# Patient Record
Sex: MALE | Race: WHITE
[De-identification: names, ages, dates, MRNs, and addresses within clinical notes are randomized per-mention and may not be internally consistent; named-entity substitution may affect disease eponyms.]

---

## 2017-03-11 ENCOUNTER — HOSPITAL ENCOUNTER (INPATIENT)
Dept: HOSPITAL 17 - NEPB | Age: 32
LOS: 2 days | Discharge: LEFT BEFORE DISCHARGE | DRG: 854 | End: 2017-03-13
Attending: HOSPITALIST | Admitting: HOSPITALIST
Payer: COMMERCIAL

## 2017-03-11 VITALS
DIASTOLIC BLOOD PRESSURE: 90 MMHG | OXYGEN SATURATION: 100 % | RESPIRATION RATE: 24 BRPM | TEMPERATURE: 100.8 F | SYSTOLIC BLOOD PRESSURE: 132 MMHG | HEART RATE: 104 BPM

## 2017-03-11 DIAGNOSIS — F17.200: ICD-10-CM

## 2017-03-11 DIAGNOSIS — F14.10: ICD-10-CM

## 2017-03-11 DIAGNOSIS — A41.9: Primary | ICD-10-CM

## 2017-03-11 DIAGNOSIS — G89.29: ICD-10-CM

## 2017-03-11 DIAGNOSIS — L02.612: ICD-10-CM

## 2017-03-11 DIAGNOSIS — F11.10: ICD-10-CM

## 2017-03-11 DIAGNOSIS — L02.611: ICD-10-CM

## 2017-03-11 DIAGNOSIS — E11.9: ICD-10-CM

## 2017-03-11 DIAGNOSIS — L03.116: ICD-10-CM

## 2017-03-11 LAB
ALP SERPL-CCNC: 106 U/L (ref 45–117)
ALT SERPL-CCNC: 21 U/L (ref 12–78)
ANION GAP SERPL CALC-SCNC: 10 MEQ/L (ref 5–15)
AST SERPL-CCNC: 32 U/L (ref 15–37)
BASOPHILS # BLD AUTO: 0.1 TH/MM3 (ref 0–0.2)
BASOPHILS NFR BLD: 0.6 % (ref 0–2)
BILIRUB SERPL-MCNC: 0.7 MG/DL (ref 0.2–1)
BUN SERPL-MCNC: 10 MG/DL (ref 7–18)
CHLORIDE SERPL-SCNC: 97 MEQ/L (ref 98–107)
EOSINOPHIL # BLD: 0 TH/MM3 (ref 0–0.4)
EOSINOPHIL NFR BLD: 0.3 % (ref 0–4)
ERYTHROCYTE [DISTWIDTH] IN BLOOD BY AUTOMATED COUNT: 14 % (ref 11.6–17.2)
GFR SERPLBLD BASED ON 1.73 SQ M-ARVRAT: 81 ML/MIN (ref 89–?)
HCO3 BLD-SCNC: 28.4 MEQ/L (ref 21–32)
HCT VFR BLD CALC: 40.5 % (ref 39–51)
HEMO FLAGS: (no result)
LYMPHOCYTES # BLD AUTO: 1.9 TH/MM3 (ref 1–4.8)
LYMPHOCYTES NFR BLD AUTO: 10.7 % (ref 9–44)
MCH RBC QN AUTO: 30.1 PG (ref 27–34)
MCHC RBC AUTO-ENTMCNC: 34.5 % (ref 32–36)
MCV RBC AUTO: 87.3 FL (ref 80–100)
MONOCYTES NFR BLD: 5.8 % (ref 0–8)
NEUTROPHILS # BLD AUTO: 14.9 TH/MM3 (ref 1.8–7.7)
NEUTROPHILS NFR BLD AUTO: 82.6 % (ref 16–70)
PLATELET # BLD: 278 TH/MM3 (ref 150–450)
POTASSIUM SERPL-SCNC: 4 MEQ/L (ref 3.5–5.1)
RBC # BLD AUTO: 4.63 MIL/MM3 (ref 4.5–5.9)
SODIUM SERPL-SCNC: 135 MEQ/L (ref 136–145)
WBC # BLD AUTO: 18 TH/MM3 (ref 4–11)

## 2017-03-11 PROCEDURE — 83605 ASSAY OF LACTIC ACID: CPT

## 2017-03-11 PROCEDURE — 87070 CULTURE OTHR SPECIMN AEROBIC: CPT

## 2017-03-11 PROCEDURE — 87040 BLOOD CULTURE FOR BACTERIA: CPT

## 2017-03-11 PROCEDURE — 96375 TX/PRO/DX INJ NEW DRUG ADDON: CPT

## 2017-03-11 PROCEDURE — 87186 SC STD MICRODIL/AGAR DIL: CPT

## 2017-03-11 PROCEDURE — 86403 PARTICLE AGGLUT ANTBDY SCRN: CPT

## 2017-03-11 PROCEDURE — 85025 COMPLETE CBC W/AUTO DIFF WBC: CPT

## 2017-03-11 PROCEDURE — 86140 C-REACTIVE PROTEIN: CPT

## 2017-03-11 PROCEDURE — 73630 X-RAY EXAM OF FOOT: CPT

## 2017-03-11 PROCEDURE — 73720 MRI LWR EXTREMITY W/O&W/DYE: CPT

## 2017-03-11 PROCEDURE — A9579 GAD-BASE MR CONTRAST NOS,1ML: HCPCS

## 2017-03-11 PROCEDURE — 87147 CULTURE TYPE IMMUNOLOGIC: CPT

## 2017-03-11 PROCEDURE — 96374 THER/PROPH/DIAG INJ IV PUSH: CPT

## 2017-03-11 PROCEDURE — 87206 SMEAR FLUORESCENT/ACID STAI: CPT

## 2017-03-11 PROCEDURE — 80053 COMPREHEN METABOLIC PANEL: CPT

## 2017-03-11 PROCEDURE — 87205 SMEAR GRAM STAIN: CPT

## 2017-03-11 PROCEDURE — 87102 FUNGUS ISOLATION CULTURE: CPT

## 2017-03-11 PROCEDURE — 87116 MYCOBACTERIA CULTURE: CPT

## 2017-03-11 PROCEDURE — 85652 RBC SED RATE AUTOMATED: CPT

## 2017-03-11 PROCEDURE — 80202 ASSAY OF VANCOMYCIN: CPT

## 2017-03-11 PROCEDURE — 82565 ASSAY OF CREATININE: CPT

## 2017-03-11 PROCEDURE — 87015 SPECIMEN INFECT AGNT CONCNTJ: CPT

## 2017-03-11 NOTE — RADRPT
EXAM DATE/TIME:  03/11/2017 22:30 

 

[facility] COMPARISON:     

No previous studies available for comparison.

 

                     

INDICATIONS :     

Left foot pain and swelling. Abscess on top of foot.

                     

 

MEDICAL HISTORY :     

None.          

 

SURGICAL HISTORY :     

None.   

 

ENCOUNTER:     

Initial                                        

 

ACUITY:     

1 day      

 

PAIN SCORE:     

Non-responsive.

 

LOCATION:     

Left  foot.

 

FINDINGS:     

There is soft tissue swelling of the lateral foot. No associated bony abnormalities identified. No fr
acture or dislocation. No bony destructive changes.

 

CONCLUSION:     

1. Soft tissue swelling on dorsal lateral aspect of the foot. No acute bony abnormality.

 

 

 

 Brian Ngo MD on March 11, 2017 at 22:41           

Board Certified Radiologist.

 This report was verified electronically.

## 2017-03-11 NOTE — PD
HPI


Chief Complaint:  abscess


Time Seen by Provider:  21:42


Travel History


International Travel<30 days:  No


Contact w/Intl Traveler<30days:  No


Traveled to known affect area:  No





History of Present Illness


HPI


Patient comes in complaining of left foot pain/abscess began approximately 6 

days ago.  Patient states started off as a small bump he pressed on and felt it 

spread underneath his foot and shoot up his leg.  He states his has become 

progressively worse for the past 3 days.  Patient reports having burning/

throbbing/aching pain in his foot that radiates proximally.  Patient states 

that his toes of his left foot are numb.  Denies any known fevers, nausea, 

vomiting, IV drug use, abdominal pain, chest pain, shortness of breath, or 

anything making the pain better.  Patient was reports that he takes Dilaudid by 

mouth 3 times a day for chronic pain status post a 5 hours ago.  Patient also 

reports taking Robaxin and Xanax.  Patient states that approximately 8 months 

ago he was told that he was diabetic but has not really followed up on it and 

only checks his blood sugars occasionally.  Patient reports he was prescribed 

insulin but does not take it.





PFSH


Past Medical History


Hx Anticoagulant Therapy:  No


Arthritis:  No


Cancer:  No


Cardiovascular Problems:  No


Chemotherapy:  No


Chest Pain:  No


Cerebrovascular Accident:  No


Diabetes:  No


Diminished Hearing:  No


Endocrine:  No


Gastrointestinal Disorders:  No


Genitourinary:  No


Immune Disorder:  No


Musculoskeletal:  Yes


Neurologic:  No


Psychiatric:  No


Reproductive:  No


Respiratory:  No


Immunizations Current:  No


Migraines:  No


Radiation Therapy:  No


Seizures:  No





Past Surgical History


Abdominal Surgery:  No


Cardiac Surgery:  No


Ear Surgery:  No


Endocrine Surgery:  No


Eye Surgery:  No


Genitourinary Surgery:  No


Gynecologic Surgery:  No


Hysterectomy:  No


Neurologic Surgery:  No


Oral Surgery:  No


Thoracic Surgery:  No


Other Surgery:  Yes (right arm )





Social History


Alcohol Use:  No


Tobacco Use:  Yes (1/2 ppd)


Substance Use:  Yes (Positive for Cocaine)





Allergies-Medications


(Allergen,Severity, Reaction):  


Coded Allergies:  


     No Known Allergies (Unverified , 3/11/17)


Reported Meds & Prescriptions





Reported Meds & Active Scripts


Active


Reported


Dilaudid 8 mg (Hydromorphone HCl) 8 Mg Tab 8 Mg PO TID


Robaxin (Methocarbamol) 750 Mg Tab 750 Mg PO BID








Review of Systems


Except as stated in HPI:  all other systems reviewed are Neg





Physical Exam


Narrative


GENERAL: Well-developed, well nourished, and non-ill appearing.  Patient 

rolling around in bed grabbing his foot and moaning.


SKIN: Erythematous, febrile, fluctuant abscess in the left foot with 

surrounding cellulitis and streaking up the leg.  There is no crepitus.  Scant 

yellowish drainage is noted.


HEAD: Atraumatic. Normocephalic. 


EYES: Pupils equal and round. EOMI. No scleral icterus. No injection or 

drainage. 


ENT: No nasal bleeding or discharge.  Mucous membranes pink and moist.


NECK: Trachea midline. Supple. No nuclear rigidity.


CARDIOVASCULAR: Regular rate and rhythm.  No murmur appreciated.


RESPIRATORY: No accessory muscle use.  No respiratory distress. Clear to 

auscultation. Breath sounds equal bilaterally. 


MUSCULOSKELETAL: No obvious deformities. No clubbing.  No cyanosis.  Soft 

tissue swelling left foot.  Full range of motion.


NEUROLOGICAL: Awake and alert. No obvious cranial nerve deficits.  Motor 

grossly within normal limits. Normal speech.


PSYCHIATRIC: Appropriate mood and affect; insight and judgment normal.





Data


Data


Last Documented VS





Vital Signs








  Date Time  Temp Pulse Resp B/P Pulse Ox O2 Delivery O2 Flow Rate FiO2


 


3/11/17 22:15 100.8 104 24 132/90 100   








Orders





 Wound Culture And Gram Stain (3/11/17 21:35)


Lidocai-Epi 1%-1:100,000 Inj (Xylocaine- (3/11/17 21:45)


Complete Blood Count With Diff (3/11/17 21:35)


Comprehensive Metabolic Panel (3/11/17 21:35)


Lactic Acid Sepsis Protocol (3/11/17 21:35)


Blood Culture (3/11/17 21:35)


Ecg Monitoring (3/11/17 21:35)


Iv Access Insert/Monitor (3/11/17 21:35)


Oximetry (3/11/17 21:35)


Oxygen Administration (3/11/17 21:35)


Hydromorphone Pf Inj (Dilaudid Pf Inj) (3/11/17 21:45)


Ondansetron Inj (Zofran Inj) (3/11/17 21:45)


Piperacil-Tazo 4.5 Gm Premix (Zosyn 4.5 (3/11/17 21:35)


Vancomycin Inj (Vancomycin Inj) (3/11/17 21:35)


Sodium Chlor 0.9% 1000 Ml Inj (Ns 1000 M (3/11/17 21:45)


C-Reactive Protein (Crp) (3/11/17 21:35)


Foot, Complete ([id number]) (3/11/17 )


Westergren Sedimentation Rate (3/11/17 21:55)


Ibuprofen (Motrin) (3/11/17 22:15)


Sodium Chlor 0.9% 1000 Ml Inj (Ns 1000 M (3/11/17 23:30)


Admit Order (Ed Use Only) (3/11/17 23:19)





Labs





 Laboratory Tests








Test 3/11/17 3/11/17





 22:24 22:30


 


White Blood Count 18.0 TH/MM3 


 


Red Blood Count 4.63 MIL/MM3 


 


Hemoglobin 13.9 GM/DL 


 


Hematocrit 40.5 % 


 


Mean Corpuscular Volume 87.3 FL 


 


Mean Corpuscular Hemoglobin 30.1 PG 


 


Mean Corpuscular Hemoglobin 34.5 % 





Concent  


 


Red Cell Distribution Width 14.0 % 


 


Platelet Count 278 TH/MM3 


 


Mean Platelet Volume 8.4 FL 


 


Neutrophils (%) (Auto) 82.6 % 


 


Lymphocytes (%) (Auto) 10.7 % 


 


Monocytes (%) (Auto) 5.8 % 


 


Eosinophils (%) (Auto) 0.3 % 


 


Basophils (%) (Auto) 0.6 % 


 


Neutrophils # (Auto) 14.9 TH/MM3 


 


Lymphocytes # (Auto) 1.9 TH/MM3 


 


Monocytes # (Auto) 1.0 TH/MM3 


 


Eosinophils # (Auto) 0.0 TH/MM3 


 


Basophils # (Auto) 0.1 TH/MM3 


 


CBC Comment DIFF FINAL  


 


Differential Comment   


 


Sodium Level 135 MEQ/L 


 


Potassium Level 4.0 MEQ/L 


 


Chloride Level 97 MEQ/L 


 


Carbon Dioxide Level 28.4 MEQ/L 


 


Anion Gap 10 MEQ/L 


 


Blood Urea Nitrogen 10 MG/DL 


 


Creatinine 1.07 MG/DL 


 


Estimat Glomerular Filtration 81 ML/MIN 





Rate  


 


Random Glucose 108 MG/DL 


 


Calcium Level 8.7 MG/DL 


 


Total Bilirubin 0.7 MG/DL 


 


Aspartate Amino Transf 32 U/L 





(AST/SGOT)  


 


Alanine Aminotransferase 21 U/L 





(ALT/SGPT)  


 


Alkaline Phosphatase 106 U/L 


 


C-Reactive Protein 14.70 MG/DL 


 


Total Protein 8.2 GM/DL 


 


Albumin 3.7 GM/DL 


 


Erythrocyte Sedimentation Rate  15 mm/hr











MDM


Medical Decision Making


Medical Screen Exam Complete:  Yes


Emergency Medical Condition:  Yes


Differential Diagnosis


Abscess, cellulitis, osteomyelitis, sepsis, other


Narrative Course


Patient was seen and examined.  Initial laboratory and radiological studies 

were ordered.  Patient is given IV fluids, IV antibiotics Zosyn and vancomycin, 

and a wound culture was obtained from liquid that is draining from his left 

foot.  Patient was given Dilaudid for pain and ibuprofen for his fever.





Discussed patient with Dr. Rees, who came and evaluated the patient and 

recommends having the patient admitted for IV antibiotics and possible I&D by 

podiatry.





2320 patient's family is at bedside reports patient is not acting his normal 

self.  Discussed all findings and plan of care with patient and family, who is 

agreeable for admission.  All questions were answered.





Sepsis Criteria


SIRS Criteria (2 or more):  Heart rate over 90, RR  > 20 or PaCO2 < 32, WBC > 

84457, < 4000 or > 10% bands


Sepsis Criteria (SIRS+source):  Infect source susp/known


Criteria Outcome:  Meets sepsis criteria





Physician Communication


Physician Communication


2314 discussed patient with Dr. Knapp, was agreeable to admit the patient.





Diagnosis





 Primary Impression:  


 Sepsis


 Qualified Code:  A41.9 - Sepsis, due to unspecified organism


 Additional Impressions:  


 Cellulitis of left foot


 Foot abscess, left





Admitting Information


Admitting Physician Requests:  Admit


Condition:  Stable








Carlos Foss Mar 11, 2017 21:50

## 2017-03-12 VITALS
RESPIRATION RATE: 14 BRPM | OXYGEN SATURATION: 96 % | TEMPERATURE: 98.1 F | HEART RATE: 64 BPM | DIASTOLIC BLOOD PRESSURE: 68 MMHG | SYSTOLIC BLOOD PRESSURE: 108 MMHG

## 2017-03-12 VITALS
HEART RATE: 66 BPM | DIASTOLIC BLOOD PRESSURE: 60 MMHG | RESPIRATION RATE: 19 BRPM | SYSTOLIC BLOOD PRESSURE: 126 MMHG | OXYGEN SATURATION: 97 % | TEMPERATURE: 98.6 F

## 2017-03-12 VITALS
DIASTOLIC BLOOD PRESSURE: 62 MMHG | TEMPERATURE: 98.9 F | SYSTOLIC BLOOD PRESSURE: 103 MMHG | HEART RATE: 58 BPM | RESPIRATION RATE: 18 BRPM | OXYGEN SATURATION: 98 %

## 2017-03-12 VITALS
HEART RATE: 67 BPM | OXYGEN SATURATION: 96 % | SYSTOLIC BLOOD PRESSURE: 105 MMHG | TEMPERATURE: 98.5 F | RESPIRATION RATE: 16 BRPM | DIASTOLIC BLOOD PRESSURE: 63 MMHG

## 2017-03-12 VITALS
TEMPERATURE: 97.2 F | HEART RATE: 50 BPM | SYSTOLIC BLOOD PRESSURE: 100 MMHG | RESPIRATION RATE: 18 BRPM | DIASTOLIC BLOOD PRESSURE: 59 MMHG | OXYGEN SATURATION: 99 %

## 2017-03-12 VITALS — HEART RATE: 83 BPM

## 2017-03-12 VITALS — HEART RATE: 56 BPM

## 2017-03-12 LAB
ALP SERPL-CCNC: 94 U/L (ref 45–117)
ALT SERPL-CCNC: 19 U/L (ref 12–78)
ANION GAP SERPL CALC-SCNC: 8 MEQ/L (ref 5–15)
AST SERPL-CCNC: 17 U/L (ref 15–37)
BASOPHILS # BLD AUTO: 0.1 TH/MM3 (ref 0–0.2)
BASOPHILS NFR BLD: 0.6 % (ref 0–2)
BILIRUB SERPL-MCNC: 0.6 MG/DL (ref 0.2–1)
BUN SERPL-MCNC: 8 MG/DL (ref 7–18)
CHLORIDE SERPL-SCNC: 105 MEQ/L (ref 98–107)
EOSINOPHIL # BLD: 0 TH/MM3 (ref 0–0.4)
EOSINOPHIL NFR BLD: 0.2 % (ref 0–4)
ERYTHROCYTE [DISTWIDTH] IN BLOOD BY AUTOMATED COUNT: 13.6 % (ref 11.6–17.2)
GFR SERPLBLD BASED ON 1.73 SQ M-ARVRAT: 98 ML/MIN (ref 89–?)
HCO3 BLD-SCNC: 25.9 MEQ/L (ref 21–32)
HCT VFR BLD CALC: 37.7 % (ref 39–51)
HEMO FLAGS: (no result)
LYMPHOCYTES # BLD AUTO: 3.2 TH/MM3 (ref 1–4.8)
LYMPHOCYTES NFR BLD AUTO: 18.2 % (ref 9–44)
MCH RBC QN AUTO: 29.8 PG (ref 27–34)
MCHC RBC AUTO-ENTMCNC: 33.3 % (ref 32–36)
MCV RBC AUTO: 89.4 FL (ref 80–100)
MONOCYTES NFR BLD: 7.6 % (ref 0–8)
NEUTROPHILS # BLD AUTO: 12.9 TH/MM3 (ref 1.8–7.7)
NEUTROPHILS NFR BLD AUTO: 73.4 % (ref 16–70)
PLATELET # BLD: 235 TH/MM3 (ref 150–450)
POTASSIUM SERPL-SCNC: 3.7 MEQ/L (ref 3.5–5.1)
RBC # BLD AUTO: 4.21 MIL/MM3 (ref 4.5–5.9)
SODIUM SERPL-SCNC: 139 MEQ/L (ref 136–145)
WBC # BLD AUTO: 17.6 TH/MM3 (ref 4–11)

## 2017-03-12 RX ADMIN — MORPHINE SULFATE PRN MG: 2 INJECTION, SOLUTION INTRAMUSCULAR; INTRAVENOUS at 17:50

## 2017-03-12 RX ADMIN — MORPHINE SULFATE PRN MG: 2 INJECTION, SOLUTION INTRAMUSCULAR; INTRAVENOUS at 21:30

## 2017-03-12 RX ADMIN — SODIUM CHLORIDE, PRESERVATIVE FREE SCH ML: 5 INJECTION INTRAVENOUS at 21:00

## 2017-03-12 RX ADMIN — PHENYTOIN SODIUM SCH MLS/HR: 50 INJECTION INTRAMUSCULAR; INTRAVENOUS at 03:51

## 2017-03-12 RX ADMIN — VANCOMYCIN HYDROCHLORIDE SCH MLS/HR: 1 INJECTION, SOLUTION INTRAVENOUS at 08:32

## 2017-03-12 RX ADMIN — MORPHINE SULFATE PRN MG: 2 INJECTION, SOLUTION INTRAMUSCULAR; INTRAVENOUS at 03:53

## 2017-03-12 RX ADMIN — CEFEPIME SCH MLS/HR: 1 INJECTION, POWDER, FOR SOLUTION INTRAMUSCULAR; INTRAVENOUS at 08:38

## 2017-03-12 RX ADMIN — PHENYTOIN SODIUM SCH MLS/HR: 50 INJECTION INTRAMUSCULAR; INTRAVENOUS at 21:25

## 2017-03-12 RX ADMIN — SODIUM CHLORIDE, PRESERVATIVE FREE SCH ML: 5 INJECTION INTRAVENOUS at 08:38

## 2017-03-12 RX ADMIN — CEFEPIME SCH MLS/HR: 1 INJECTION, POWDER, FOR SOLUTION INTRAMUSCULAR; INTRAVENOUS at 21:26

## 2017-03-12 RX ADMIN — MORPHINE SULFATE PRN MG: 2 INJECTION, SOLUTION INTRAMUSCULAR; INTRAVENOUS at 08:31

## 2017-03-12 RX ADMIN — VANCOMYCIN HYDROCHLORIDE SCH MLS/HR: 1 INJECTION, SOLUTION INTRAVENOUS at 16:40

## 2017-03-12 NOTE — HHI.PR
Subjective


Remarks


good po appetite


poked his foot with a razor  a week ago





is on chronic pain meds- Dilaudid 8 mg po q 8- sees Dr. Mnedoza - pain 

  MD from [place]





Objective


Vitals





 Vital Signs








  Date Time  Temp Pulse Resp B/P Pulse Ox O2 Delivery O2 Flow Rate FiO2


 


3/12/17 12:00 98.1 64 14 108/68 96   


 


3/12/17 08:01 98.9 58 18 103/62 98   


 


3/12/17 07:19  56      


 


3/12/17 04:00 97.2 53 18 108/59 100   


 


3/12/17 01:20 98.5 67 16 105/63 96 Room Air  


 


3/12/17 00:19     100 Room Air  


 


3/11/17 22:15 100.8 104 24 132/90 100   








 I/O








 3/11/17 3/11/17 3/11/17 3/12/17 3/12/17 3/12/17





 07:00 15:00 23:00 07:00 15:00 23:00


 


Intake Total    300 ml  


 


Balance    300 ml  


 


      


 


Intake IV Total    300 ml  








Result Diagram:  


3/12/17 0407                                                                   

             3/12/17 0457





Imaging





Last Impressions








Foot X-Ray 3/11/17 0000 Signed





Impressions: 





 Service Date/Time:  Saturday, March 11, 2017 22:30 - CONCLUSION:  1. Soft 

tissue 





 swelling on dorsal lateral aspect of the foot. No acute bony abnormality.     





 Brian Ngo MD 








Objective Remarks


awake and alert, NAD


anicteric


lungs clear


regular rhythm


abdomen soft


Left entire foot- marked swelling and erythema with induration  on anterior 

aspect, tender 


   ++ DP, PT. moves toes freely





A/P


Problem List:  


(1) Sepsis


ICD Code:  A41.9


Status:  Acute


(2) Foot abscess, left


ICD Code:  L02.612


Status:  Acute


(3) Cellulitis of left foot


ICD Code:  L03.116


Status:  Acute


(4) Tobacco abuse


ICD Code:  Z72.0


Status:  Acute


(5) Substance abuse


ICD Code:  F19.10


Status:  Acute


Assessment and Plan


A/P:





Sepsis:  secondary to -Left Foot Abscess/Cellulitis, On Cefepime and Vancomycin


   Follow up cultures, continue IV Abx. Podiatry consulted - need for I and D


Substance Abuse:  h/o Cocaine/Opiate abuse


Chronic pain, reports takes Dilaudid po for chronic pain.  


   confirmed - father brought in Dilaudid bottles filled 3/7- MD Dr. Mendoza- 

Dilaudid  8 mg po q 8 prn for pain- we will continue this


   continue on prn IV Morphine . DC po Lortab


   counselled extensively


Tobacco Abuse:  Counselled.  Ativan/NicoDerm prn for withdrawal.


DVT Prophylaxis:  Mechanical contraindication due to wound


Social work for d/c planning as needed. 





discussed with patient and father





Problem Qualifiers





(1) Sepsis:  


Qualified Code:  A41.9 - Sepsis, due to unspecified organism





Komal Rizo MD Mar 12, 2017 13:57

## 2017-03-12 NOTE — RADRPT
EXAM DATE/TIME:  03/12/2017 09:31 

 

[facility] COMPARISON:     

No previous studies available for comparison.

       

 

 

INDICATIONS :     

***Abscess. 

                     

 

CONTRAST:     

10 cc Omniscan (gadodiamide) IV

                     

 

MEDICAL HISTORY :     

Hypertension. Diabetes mellitus type 2.   IVDA.

 

SURGICAL HISTORY :           

Prior multiple abcess drainage.

 

ENCOUNTER:     

Subsequent

 

ACUITY:     

4-6 days

 

PAIN SCORE:     

6/10

 

LOCATION:     

Left   foot

 

TECHNIQUE:     

Multiplanar, multisequence MRI examination was performed without contrast and after the intravenous a
dministration of gadolinium.

 

FINDINGS:     

 

BONE/CARTILAGE:     

There is some mild increased signal on the T2-weighted images at the distal calcaneus at the anterior
 subtalar region and in the navicular bone and the lateral cuneiform bone. These could be areas of celestino
ne bruising or may represent some inflammatory change/granulation around the joints. The signal abnor
mality is mild.

 

TENDONS:     

All of the visualized tendons are intact.

 

MISCELLANEOUS:     

Plantar aponeurosis is intact.  Sinus tarsi is within normal limits.

 

POST-CONTRAST:     

There is a loculated fluid collection in the superficial lateral midfoot extending over the cuneiform
 bones measuring approximately 7.5 cm in AP dimension, 4.5 cm in transverse dimension and 1.6 cm in t
hickness. This demonstrates rim enhancement. This mass is superficial to the extensor tendons.

 

CONCLUSION:     

1. 7 cm superficial loculated fluid collection at the dorsal lateral mid to hindfoot likely represent
ing an abscess.

2. Mild areas of patchy increased signal within the bones of the hindfoot and midfoot as described ab
ove.

 

 

 

 Milton Diop MD on March 12, 2017 at 10:24           

Board Certified Radiologist.

 This report was verified electronically.

## 2017-03-12 NOTE — PD.CONS
History of Present Illness


Service


podiatry


Consult Requested By





Reason for Consult


L foot abscess


Primary Care Physician


No Primary Care Physician


Diagnoses:  


History of Present Illness


Patient admitted for redness, swelling, infection L foot worsening over the 

past 6 days. He states he tried popping it and slicing it with a razor blade 

and it kept getting bigger and more painful. He states he does not know how it 

happened.





Past Family Social History


Allergies:  


Coded Allergies:  


     No Known Allergies (Unverified , 3/11/17)


Past Medical History


Tobacco Abuse and Substance Abuse w/ Cocaine and Opiates


Past Surgical History


Right Arm Surgery


Active Ordered Medications





 Current Medications








 Medications


  (Trade)  Dose


 Ordered  Sig/Sallie


 Route  Start Time


 Stop Time Status Last Admin


 


 Pharmacy Profile


 Note  0 ml @ 0


 mls/hr  UN[facility]


 OTHER  3/11/17 23:45


     


 


 


 Cefepime HCl 1000


 mg/Sodium Chloride  100 ml @ 


 200 mls/hr  Q12H


 IV  3/12/17 09:00


    3/12/17 21:26


 


 


  (NS 1000 ml Inj)  1,000 ml @ 


 100 mls/hr  Q10H


 IV  3/11/17 23:36


    3/12/17 21:25


 


 


  (NS Flush)  2 ml  UNSCH  PRN


 FLUSH  3/11/17 23:45


    3/12/17 21:27


 


 


  (NS Flush)  2 ml  BID


 FLUSH  3/12/17 09:00


     


 


 


  (Zofran Inj)  4 mg  Q6H  PRN


 IVP  3/11/17 23:45


    3/12/17 03:52


 


 


  (Dulcolax Supp)  10 mg  DAILY  PRN


 ND  3/11/17 23:45


     


 


 


 Acetaminophen 650


 mg  650 mg  Q6H  PRN


 PO  3/11/17 23:45


     


 


 


  (Vancomycin Inj/


  ml Inj)  250 ml @ 


 250 mls/hr  Q8H


 IV  3/12/17 08:00


    3/12/17 16:40


 


 


 Miscellaneous


 Information  SPECIFIC


 LAB TO BE


 DONA...  ONCE  ONCE


 XX  3/13/17 07:45


 3/13/17 07:46   


 


 


  (Flu


  (Quadrivalent)


 Vaccine Inj)  0.5 ml  ONCE ONCE


 IM  3/13/17 10:00


 3/13/17 10:01   


 


 


  (Dilaudid)  8 mg  Q8H  PRN


 PO  3/12/17 14:30


    3/12/17 16:39


 


 


 Morphine Sulfate


 1 mg  1 mg  Q4H  PRN


 IV  3/12/17 16:00


    3/12/17 21:30


 


 


 Lactated Ringer's  1,000 ml @ 


 30 mls/hr  Q24H


 IV  3/12/17 20:30


     


 


 


  ( ml Inj)  500 ml @ 


 30 mls/hr  [id number]


 IV  3/12/17 20:30


 3/13/17 20:29   


 








Family History


denies


Social History


denies alcohol.  Smokes 1/2-1ppd.  Positive for Cocaine/Opiates.





Physical Exam


Vital Signs





 Vital Signs








  Date Time  Temp Pulse Resp B/P Pulse Ox O2 Delivery O2 Flow Rate FiO2


 


3/12/17 16:00 98.6 66 19 126/60 97   


 


3/12/17 12:00 98.1 64 14 108/68 96   


 


3/12/17 08:01 98.9 58 18 103/62 98   


 


3/12/17 07:19  56      


 


3/12/17 04:00 97.2 53 18 108/59 100   


 


3/12/17 01:20 98.5 67 16 105/63 96 Room Air  


 


3/12/17 00:19     100 Room Air  








Physical Exam


L foot with erythema and edema to ankle. Dorsolateral foot with fluctuance and 

fibrotic area. Very painful to palpation. Subcutaneous fluctuant area  approx 

15cm x 10cm


Neurovascularly intact LLE


Laboratory





Laboratory Tests








Test 3/11/17 3/11/17 3/11/17 3/12/17





 22:24 22:30 23:31 04:07


 


White Blood Count 18.0    17.6 


 


Red Blood Count 4.63    4.21 


 


Hemoglobin 13.9    12.5 


 


Hematocrit 40.5    37.7 


 


Mean Corpuscular Volume 87.3    89.4 


 


Mean Corpuscular Hemoglobin 30.1    29.8 


 


Mean Corpuscular Hemoglobin 34.5    33.3 





Concent    


 


Red Cell Distribution Width 14.0    13.6 


 


Platelet Count 278    235 


 


Mean Platelet Volume 8.4    8.3 


 


Neutrophils (%) (Auto) 82.6    73.4 


 


Lymphocytes (%) (Auto) 10.7    18.2 


 


Monocytes (%) (Auto) 5.8    7.6 


 


Eosinophils (%) (Auto) 0.3    0.2 


 


Basophils (%) (Auto) 0.6    0.6 


 


Neutrophils # (Auto) 14.9    12.9 


 


Lymphocytes # (Auto) 1.9    3.2 


 


Monocytes # (Auto) 1.0    1.3 


 


Eosinophils # (Auto) 0.0    0.0 


 


Basophils # (Auto) 0.1    0.1 


 


CBC Comment DIFF FINAL    DIFF FINAL 


 


Differential Comment      


 


Sodium Level 135    


 


Potassium Level 4.0    


 


Chloride Level 97    


 


Carbon Dioxide Level 28.4    


 


Anion Gap 10    


 


Blood Urea Nitrogen 10    


 


Creatinine 1.07    


 


Estimat Glomerular Filtration 81    





Rate    


 


Random Glucose 108    


 


Calcium Level 8.7    


 


Total Bilirubin 0.7    


 


Aspartate Amino Transf 32    





(AST/SGOT)    


 


Alanine Aminotransferase 21    





(ALT/SGPT)    


 


Alkaline Phosphatase 106    


 


C-Reactive Protein 14.70    


 


Total Protein 8.2    


 


Albumin 3.7    


 


Erythrocyte Sedimentation Rate  15   


 


Lactic Acid Level   0.9  


 


    





Test 3/12/17   





 04:57   


 


Sodium Level 139    


 


Potassium Level 3.7    


 


Chloride Level 105    


 


Carbon Dioxide Level 25.9    


 


Anion Gap 8    


 


Blood Urea Nitrogen 8    


 


Creatinine 0.91    


 


Estimat Glomerular Filtration 98    





Rate    


 


Random Glucose 101    


 


Calcium Level 8.2    


 


Total Bilirubin 0.6    


 


Aspartate Amino Transf 17    





(AST/SGOT)    


 


Alanine Aminotransferase 19    





(ALT/SGPT)    


 


Alkaline Phosphatase 94    


 


Total Protein 6.8    


 


Albumin 2.9    














 Date/Time Procedure Status





Source Growth 


 


 3/12/17 17:30 Gram Stain Received





Wound Foot Pending 





 3/12/17 17:30 Wound Culture Received





Wound Foot Pending 


 


 3/12/17 01:30 Gram Stain - Final Resulted





Wound Foot  





 3/12/17 01:30 Wound Culture Resulted





Wound Foot Pending 


 


 3/11/17 23:31 Aerobic Blood Culture - Preliminary Resulted





Blood Peripheral NO GROWTH IN 1 DAY 





 3/11/17 23:31 Anaerobic Blood Culture - Preliminary Resulted





Blood Peripheral NO GROWTH IN 1 DAY 








Result Diagram:  


3/12/17 0407                                                                   

             3/12/17 0457





Imaging





Last Impressions








Foot X-Ray 3/11/17 0000 Signed





Impressions: 





 Service Date/Time:  Saturday, March 11, 2017 22:30 - CONCLUSION:  1. Soft 

tissue 





 swelling on dorsal lateral aspect of the foot. No acute bony abnormality.     





 Brian Ngo MD 











Assessment and Plan


Assessment and Plan


Abscess L foot


   Patient consented to bedside I&D. Incision made into central fibrotic area 

of fluctuance and approx 20mL milky putrid purulent drainage expressed. Cultured


   Betadine wet to dry dressing applied. Patient kept removing bandage.


   Patient to OR tomorrow morning for I&D with Dr Lockwood [id number] 


   NPO after midnight








Ines Bragg DPM Mar 12, 2017 22:27

## 2017-03-13 VITALS
RESPIRATION RATE: 16 BRPM | TEMPERATURE: 98.5 F | HEART RATE: 77 BPM | SYSTOLIC BLOOD PRESSURE: 114 MMHG | OXYGEN SATURATION: 99 % | DIASTOLIC BLOOD PRESSURE: 53 MMHG

## 2017-03-13 VITALS
HEART RATE: 64 BPM | RESPIRATION RATE: 18 BRPM | SYSTOLIC BLOOD PRESSURE: 96 MMHG | TEMPERATURE: 97.1 F | OXYGEN SATURATION: 99 % | DIASTOLIC BLOOD PRESSURE: 50 MMHG

## 2017-03-13 VITALS
DIASTOLIC BLOOD PRESSURE: 62 MMHG | RESPIRATION RATE: 17 BRPM | HEART RATE: 91 BPM | SYSTOLIC BLOOD PRESSURE: 118 MMHG | OXYGEN SATURATION: 98 % | TEMPERATURE: 98.9 F

## 2017-03-13 VITALS
TEMPERATURE: 95.6 F | DIASTOLIC BLOOD PRESSURE: 50 MMHG | RESPIRATION RATE: 18 BRPM | SYSTOLIC BLOOD PRESSURE: 96 MMHG | OXYGEN SATURATION: 99 % | HEART RATE: 67 BPM

## 2017-03-13 LAB — GFR SERPLBLD BASED ON 1.73 SQ M-ARVRAT: 112 ML/MIN (ref 89–?)

## 2017-03-13 PROCEDURE — 0J9R0ZZ DRAINAGE OF LEFT FOOT SUBCUTANEOUS TISSUE AND FASCIA, OPEN APPROACH: ICD-10-PCS | Performed by: PODIATRIST

## 2017-03-13 PROCEDURE — 0JBR0ZZ EXCISION OF LEFT FOOT SUBCUTANEOUS TISSUE AND FASCIA, OPEN APPROACH: ICD-10-PCS | Performed by: PODIATRIST

## 2017-03-13 RX ADMIN — VANCOMYCIN HYDROCHLORIDE SCH MLS/HR: 1 INJECTION, SOLUTION INTRAVENOUS at 08:00

## 2017-03-13 RX ADMIN — VANCOMYCIN HYDROCHLORIDE SCH MLS/HR: 1 INJECTION, SOLUTION INTRAVENOUS at 00:26

## 2017-03-13 RX ADMIN — CEFEPIME SCH MLS/HR: 1 INJECTION, POWDER, FOR SOLUTION INTRAMUSCULAR; INTRAVENOUS at 09:00

## 2017-03-13 RX ADMIN — MORPHINE SULFATE PRN MG: 2 INJECTION, SOLUTION INTRAMUSCULAR; INTRAVENOUS at 05:50

## 2017-03-13 RX ADMIN — SODIUM CHLORIDE, PRESERVATIVE FREE SCH ML: 5 INJECTION INTRAVENOUS at 09:00

## 2017-03-13 RX ADMIN — VANCOMYCIN HYDROCHLORIDE SCH MLS/HR: 1 INJECTION, SOLUTION INTRAVENOUS at 17:16

## 2017-03-13 RX ADMIN — PHENYTOIN SODIUM SCH MLS/HR: 50 INJECTION INTRAMUSCULAR; INTRAVENOUS at 05:36

## 2017-03-13 NOTE — HHI.PR
Subjective


Remarks


complains of pain foot


no fever or chills





poked his foot with a razor a week ago





Objective


Vitals





 Vital Signs








  Date Time  Temp Pulse Resp B/P Pulse Ox O2 Delivery O2 Flow Rate FiO2


 


3/13/17 12:21 95.6 67 18 96/50 99   


 


3/13/17 09:30  65 14 104/58 98 Room Air  


 


3/13/17 09:15  63 15 100/60 99 Nasal Cannula 2 


 


3/13/17 09:00  67 14 98/63 100 Nasal Cannula 2 


 


3/13/17 08:57 97.7 70 15 99/63 100 Nasal Cannula 2 


 


3/13/17 04:00 98.5 77 16 114/53 99   


 


3/13/17 00:30 98.9 91 17 118/62 98   


 


3/12/17 22:44  83      


 


3/12/17 16:00 98.6 66 19 126/60 97   








 I/O








 3/12/17 3/12/17 3/12/17 3/13/17 3/13/17 3/13/17





 07:00 15:00 23:00 07:00 15:00 23:00


 


Intake Total 300 ml 480 ml   700 ml 


 


Output Total  500 ml   20 ml 


 


Balance 300 ml -20 ml   680 ml 


 


      


 


Intake Oral  480 ml    


 


IV Total 300 ml     


 


Other     700 ml 


 


Output Urine Total  500 ml    


 


Estimated Blood Loss     20 ml 


 


# Bowel Movements  0    








Result Diagram:  


3/12/17 0407                                                                   

             3/13/17 0610





Imaging





Last Impressions








Foot X-Ray 3/11/17 0000 Signed





Impressions: 





 Service Date/Time:  Saturday, March 11, 2017 22:30 - CONCLUSION:  1. Soft 

tissue 





 swelling on dorsal lateral aspect of the foot. No acute bony abnormality.     





 Brian Ngo MD 








Objective Remarks


awake and alert, NAD


anicteric


lungs clear


regular rhythm


abdomen soft


Left entire foot- post op dressing in place


   moves toes freely


Procedures


3/13- I and R foot abscess





A/P


Problem List:  


(1) Sepsis


ICD Code:  A41.9


Status:  Acute


(2) Foot abscess, left


ICD Code:  L02.612


Status:  Acute


(3) Cellulitis of left foot


ICD Code:  L03.116


Status:  Acute


(4) Tobacco abuse


ICD Code:  Z72.0


Status:  Acute


(5) Substance abuse


ICD Code:  F19.10


Status:  Acute


Assessment and Plan


A/P:





Sepsis: Left Foot Abscess S/P I and D- S. aureus


   ff sensitivity. On Vancomycin


   DC Cefepime


Substance Abuse:  h/o Cocaine/Opiate abuse


Chronic pain, reports takes Dilaudid po for chronic pain.  


   confirmed - father brought in Dilaudid bottles filled 3/7- MD Dr. Mendoza- 

Dilaudid  8 mg po q 8 prn for pain- we will continue this


   continue on prn IV Morphine for breakthrough pain


   counselled extensively


Tobacco Abuse:  Counselled.  Ativan/NicoDerm prn for withdrawal.


DVT Prophylaxis:  Mechanical contraindication due to wound


Social work for d/c planning as needed.





Problem Qualifiers





(1) Sepsis:  


Qualified Code:  A41.9 - Sepsis, due to unspecified organism





Komal Rizo MD Mar 13, 2017 13:53


Komal Rizo MD Mar 13, 2017 13:53

## 2017-03-13 NOTE — HHI.PR
__________________________________________________





Immediate Post Op Note


Procedure Date:


Mar 13, 2017


Pre Op Diagnosis:  


(1) Foot abscess, left


Post Op Diagnosis:  


(1) Foot abscess, left


Surgeon:


Peter Steele


Assistant(s):


scrub


Procedure:


Incision drainage expansile foot. left


Findings:


see op dict


Complications:


none


Specimen(s) removed:


deep wd cx


Estimated blood loss:


less 30mL


Anesthesia:  General, Local


Drains:  Other


Fluids:  see anethesia re port


Tourniquet time (min at mmHg)


215 mmhg left mid calf, approx 20 min


Patient to:  Other


Patient Condition:  Fair


Implant/Devices:  SEE IMPLANT LOG (if applicable)


Date/Time of Procedure:  SEE SURGICAL CARE RECORD








Peter Steele DPM Mar 13, 2017 08:45

## 2017-03-15 NOTE — MP
cc:

JOHN MURPHY DPM

****

 

 

DATE OF SURGERY

3/13/2017

 

PREOPERATIVE DIAGNOSIS

Left foot abscess.

 

POSTOPERATIVE DIAGNOSIS

Left foot abscess.

 

PROCEDURES PERFORMED

Incision and drainage, debridement expansile left foot.

 

ANESTHESIA

General with local 30 cc of 0.25% Marcaine plain

 

PACKING

Packing was placed.

 

ESTIMATED BLOOD LOSS

Less than 20 mL

 

SPECIMEN

A deep culture taken.

 

COMPLICATIONS

None

 

HEMOSTASIS

Mid calf tourniquet 215 mmHg for approximately 20 minutes.

 

PROCEDURE IN DETAIL

Under mild sedation, the patient was brought into the operating room and placed

on the operating table in the supine position.  Following the induction of

general anesthesia, local anesthesia was obtained utilizing a proximal high

ankle block.  The patient's left lower extremity is scrubbed, prepped and

draped in the usual aseptic fashion.  The foot was elevated and mid calf

tourniquet was inflated.

 

An incision was made curvilinear over the dorsal aspect of the foot.  This was

approximately a 15 cm incision.  Sharp and blunt dissection was carried down to

the level of the deep fascia.  The superficial peroneal nerve was identified.

There is noted to be fluid and abscess findings tracking along the dorsum of

the foot. This was opened up just at the level of the ankle down to the level

of the third and fourth metatarsal base.  Copious amounts of purulent material

was removed.  There was a mild odor.  Deep cultures were taken utilizing a

curette and pulse lavage.  The abscess wall and contents was removed.  An

incision was made deep to the extensor retinaculum.  There was no obvious

abscess probing along the extensor tendons.

 

The wound was then loosely coapted, packing placed, tourniquet dropped.  It is

questionable whether the dorsal flap will remain fully vascular.  It may need

further debridement.  We will follow along the wound within the next one to two

days.  This is a deep infection.  The patient will likely need a minimum of one

to two weeks of IV antibiotics pending cultures.

 

 

 

 

 

 

                              _________________________________

                              PERCY Chaidez/JENIFER

D:  3/13/2017/7:48 AM

T:  3/15/2017/12:16 PM

Visit #:  [id number]

Job #:  [id number]

## 2017-12-31 ENCOUNTER — HOSPITAL ENCOUNTER (INPATIENT)
Dept: HOSPITAL 17 - NEPE | Age: 32
LOS: 7 days | Discharge: LEFT BEFORE DISCHARGE | DRG: 872 | End: 2018-01-07
Attending: HOSPITALIST | Admitting: HOSPITALIST
Payer: SELF-PAY

## 2017-12-31 VITALS — HEIGHT: 69 IN | WEIGHT: 149.25 LBS | BODY MASS INDEX: 22.11 KG/M2

## 2017-12-31 VITALS
SYSTOLIC BLOOD PRESSURE: 119 MMHG | OXYGEN SATURATION: 97 % | RESPIRATION RATE: 20 BRPM | HEART RATE: 108 BPM | DIASTOLIC BLOOD PRESSURE: 61 MMHG | TEMPERATURE: 101.2 F

## 2017-12-31 VITALS
HEART RATE: 123 BPM | RESPIRATION RATE: 20 BRPM | DIASTOLIC BLOOD PRESSURE: 81 MMHG | SYSTOLIC BLOOD PRESSURE: 175 MMHG | TEMPERATURE: 99.3 F | OXYGEN SATURATION: 98 %

## 2017-12-31 DIAGNOSIS — F17.210: ICD-10-CM

## 2017-12-31 DIAGNOSIS — B95.4: ICD-10-CM

## 2017-12-31 DIAGNOSIS — I10: ICD-10-CM

## 2017-12-31 DIAGNOSIS — A41.9: Primary | ICD-10-CM

## 2017-12-31 DIAGNOSIS — Z79.891: ICD-10-CM

## 2017-12-31 DIAGNOSIS — L02.414: ICD-10-CM

## 2017-12-31 DIAGNOSIS — F19.90: ICD-10-CM

## 2017-12-31 LAB
ALBUMIN SERPL-MCNC: 3.5 GM/DL (ref 3.4–5)
ALP SERPL-CCNC: 131 U/L (ref 45–117)
ALT SERPL-CCNC: 23 U/L (ref 12–78)
AST SERPL-CCNC: 21 U/L (ref 15–37)
BASOPHILS # BLD AUTO: 0 TH/MM3 (ref 0–0.2)
BASOPHILS NFR BLD: 0.3 % (ref 0–2)
BILIRUB SERPL-MCNC: 0.5 MG/DL (ref 0.2–1)
BUN SERPL-MCNC: 14 MG/DL (ref 7–18)
CALCIUM SERPL-MCNC: 8.4 MG/DL (ref 8.5–10.1)
CHLORIDE SERPL-SCNC: 96 MEQ/L (ref 98–107)
CREAT SERPL-MCNC: 0.96 MG/DL (ref 0.6–1.3)
EOSINOPHIL # BLD: 0 TH/MM3 (ref 0–0.4)
EOSINOPHIL NFR BLD: 0.3 % (ref 0–4)
ERYTHROCYTE [DISTWIDTH] IN BLOOD BY AUTOMATED COUNT: 13.7 % (ref 11.6–17.2)
GFR SERPLBLD BASED ON 1.73 SQ M-ARVRAT: 91 ML/MIN (ref 89–?)
GLUCOSE SERPL-MCNC: 91 MG/DL (ref 74–106)
HCO3 BLD-SCNC: 28.5 MEQ/L (ref 21–32)
HCT VFR BLD CALC: 41.6 % (ref 39–51)
HGB BLD-MCNC: 14 GM/DL (ref 13–17)
INR PPP: 1.1 RATIO
LIPASE: 63 U/L (ref 73–393)
LYMPHOCYTES # BLD AUTO: 2.4 TH/MM3 (ref 1–4.8)
LYMPHOCYTES NFR BLD AUTO: 13.8 % (ref 9–44)
MAGNESIUM SERPL-MCNC: 2 MG/DL (ref 1.5–2.5)
MCH RBC QN AUTO: 29.1 PG (ref 27–34)
MCHC RBC AUTO-ENTMCNC: 33.5 % (ref 32–36)
MCV RBC AUTO: 86.8 FL (ref 80–100)
MONOCYTE #: 1 TH/MM3 (ref 0–0.9)
MONOCYTES NFR BLD: 5.6 % (ref 0–8)
NEUTROPHILS # BLD AUTO: 13.8 TH/MM3 (ref 1.8–7.7)
NEUTROPHILS NFR BLD AUTO: 80 % (ref 16–70)
PLATELET # BLD: 275 TH/MM3 (ref 150–450)
PMV BLD AUTO: 7.3 FL (ref 7–11)
PROT SERPL-MCNC: 8.8 GM/DL (ref 6.4–8.2)
PROTHROMBIN TIME: 11.4 SEC (ref 9.8–11.6)
RBC # BLD AUTO: 4.79 MIL/MM3 (ref 4.5–5.9)
SODIUM SERPL-SCNC: 133 MEQ/L (ref 136–145)
WBC # BLD AUTO: 17.2 TH/MM3 (ref 4–11)

## 2017-12-31 PROCEDURE — 96375 TX/PRO/DX INJ NEW DRUG ADDON: CPT

## 2017-12-31 PROCEDURE — 82550 ASSAY OF CK (CPK): CPT

## 2017-12-31 PROCEDURE — 87206 SMEAR FLUORESCENT/ACID STAI: CPT

## 2017-12-31 PROCEDURE — 85730 THROMBOPLASTIN TIME PARTIAL: CPT

## 2017-12-31 PROCEDURE — 96365 THER/PROPH/DIAG IV INF INIT: CPT

## 2017-12-31 PROCEDURE — 87205 SMEAR GRAM STAIN: CPT

## 2017-12-31 PROCEDURE — 83690 ASSAY OF LIPASE: CPT

## 2017-12-31 PROCEDURE — 90471 IMMUNIZATION ADMIN: CPT

## 2017-12-31 PROCEDURE — 85610 PROTHROMBIN TIME: CPT

## 2017-12-31 PROCEDURE — 80202 ASSAY OF VANCOMYCIN: CPT

## 2017-12-31 PROCEDURE — 83735 ASSAY OF MAGNESIUM: CPT

## 2017-12-31 PROCEDURE — 87015 SPECIMEN INFECT AGNT CONCNTJ: CPT

## 2017-12-31 PROCEDURE — 87116 MYCOBACTERIA CULTURE: CPT

## 2017-12-31 PROCEDURE — 87070 CULTURE OTHR SPECIMN AEROBIC: CPT

## 2017-12-31 PROCEDURE — 73201 CT UPPER EXTREMITY W/DYE: CPT

## 2017-12-31 PROCEDURE — 80053 COMPREHEN METABOLIC PANEL: CPT

## 2017-12-31 PROCEDURE — 93005 ELECTROCARDIOGRAM TRACING: CPT

## 2017-12-31 PROCEDURE — 85025 COMPLETE CBC W/AUTO DIFF WBC: CPT

## 2017-12-31 PROCEDURE — 76937 US GUIDE VASCULAR ACCESS: CPT

## 2017-12-31 PROCEDURE — 80048 BASIC METABOLIC PNL TOTAL CA: CPT

## 2017-12-31 PROCEDURE — 90714 TD VACC NO PRESV 7 YRS+ IM: CPT

## 2017-12-31 PROCEDURE — 86403 PARTICLE AGGLUT ANTBDY SCRN: CPT

## 2017-12-31 PROCEDURE — 87040 BLOOD CULTURE FOR BACTERIA: CPT

## 2017-12-31 PROCEDURE — 87102 FUNGUS ISOLATION CULTURE: CPT

## 2017-12-31 PROCEDURE — 71010: CPT

## 2017-12-31 PROCEDURE — 82552 ASSAY OF CPK IN BLOOD: CPT

## 2017-12-31 PROCEDURE — 83605 ASSAY OF LACTIC ACID: CPT

## 2017-12-31 RX ADMIN — STANDARDIZED SENNA CONCENTRATE AND DOCUSATE SODIUM SCH TAB: 8.6; 5 TABLET, FILM COATED ORAL at 22:10

## 2017-12-31 RX ADMIN — VANCOMYCIN HYDROCHLORIDE STA MLS/HR: 1 INJECTION, SOLUTION INTRAVENOUS at 21:30

## 2017-12-31 RX ADMIN — Medication SCH ML: at 22:10

## 2017-12-31 RX ADMIN — PHENYTOIN SODIUM SCH MLS/HR: 50 INJECTION INTRAMUSCULAR; INTRAVENOUS at 22:10

## 2017-12-31 RX ADMIN — ACETAMINOPHEN PRN MG: 325 TABLET ORAL at 23:34

## 2017-12-31 NOTE — EKG
Date Performed: 12/31/2017       Time Performed: 18:39:32

 

PTAGE:      32 years

 

EKG:      SINUS TACHYCARDIA WITH SHORT DE INTERVAL POSSIBLE LEFT ATRIAL ENLARGEMENT MARKED RIGHT AXIS
 DEVIATION INCOMPLETE RIGHT BUNDLE BRANCH BLOCK ABNORMAL ECG 

 

NO PREVIOUS TRACING            

 

DOCTOR:   Jordan Andersen  Interpretating Date/Time  12/31/2017 21:46:24

## 2017-12-31 NOTE — HHI.HP
__________________________________________________





[facility]


Service


[facility]


Primary Care Physician


No Primary Care Physician


Admission Diagnosis





SEPSIS/Left Arm Abscess


Diagnoses:  


(1) Sepsis


Diagnosis:  Principal





(2) Abscess of arm, left


Diagnosis:  Principal





(3) HTN (hypertension)


Diagnosis:  Principal





(4) IVDU (intravenous drug user)


Diagnosis:  Principal





(5) Tobacco abuse


Diagnosis:  Principal





Travel History


International Travel<30 Days:  No


Contact w/Intl Traveler <30 Da:  No


Traveled to Known Affected Are:  No


History of Present Illness


This is a 32-year-old male with a PMH of Tobacco Abuse and h/o IVDU who 

presented to the ER w/ complaints of left arm pain and swelling.  States 

symptoms started approx 3 days ago.  Reports progressive swelling/pain to left 

arm, now involving elbow and forearm.  Pain is constant, severe, 10/10, 

radiates down left arm.  No alleviating factors, worse w/ movement.  Denies IVDU

, however on exam pt w/ +track marks.  On arrival, /81, , O2 sat 98

% on RA, Temp 99.3.  W WBC 17.2.  Chemistry essentially unremarkable.  Lactic 

Acid 1.3.  INR 1.1.  CXR with no acute findings.  CT LUE with significant 

inflammatory changes, loculated fluid collection highly suspicious for abscess.

  S/p Blood Cultures, Vanc/Zosyn in ER.  Ortho consulted for further evaluation.





Review of Systems


Except as stated in HPI:  all other systems reviewed are Neg


ROS: 14 point review of systems otherwise negative.





Past Family Social History


Past Medical History


PMH:  Tobacco Abuse and h/o IVDU


Past Surgical History


PAST SURGICAL HISTORY:  Right Arm Surgery


Allergies:  


Coded Allergies:  


     No Known Allergies (Unverified  Allergy, Unknown, 17)


Family History


PAST FAMILY HISTORY:  Reviewed.  No h/o DM or CAD


Social History


PAST SOCIAL HISTORY:  Negative for alcohol.  Smokes 1/2-1ppd.  H/o IVDU, denies 

recent use.





Physical Exam


Vital Signs





Vital Signs








  Date Time  Temp Pulse Resp B/P (MAP) Pulse Ox O2 Delivery O2 Flow Rate FiO2


 


17 18:10 99.3 123 20 175/81 (112) 98 Room Air  








Physical Exam


PE:


GENERAL: Young male in no acute distress.


HEENT: PERRLA, EOMI. No scleral icterus or conjunctival pallor. No lid lag or 

facial droop.  


CARDIOVASCULAR: Regular rate and rhythm.  No obvious murmurs to auscultation. 

No chest tenderness to palpation. 


RESPIRATORY: No obvious rhonchi or wheezing. Clear to auscultation. Breath 

sounds equal bilaterally. 


GASTROINTESTINAL: Abdomen soft, non-tender, nondistended. BS normal. 


MUSCULOSKELETAL: Extremities without clubbing, cyanosis, or edema. No obvious 

deformities. LUE w/ swelling above elbow w/extension down to elbow/forearm. +

track marks bilateral upper extremities. Pules intact


NEUROLOGICAL: Awake, alert and oriented x4. No focal neurologic deficits. 

Moving both upper and lower extremities spontaneously.


Laboratory





Laboratory Tests








Test


  17


18:55


 


White Blood Count 17.2 


 


Red Blood Count 4.79 


 


Hemoglobin 14.0 


 


Hematocrit 41.6 


 


Mean Corpuscular Volume 86.8 


 


Mean Corpuscular Hemoglobin 29.1 


 


Mean Corpuscular Hemoglobin


Concent 33.5 


 


 


Red Cell Distribution Width 13.7 


 


Platelet Count 275 


 


Mean Platelet Volume 7.3 


 


Neutrophils (%) (Auto) 80.0 


 


Lymphocytes (%) (Auto) 13.8 


 


Monocytes (%) (Auto) 5.6 


 


Eosinophils (%) (Auto) 0.3 


 


Basophils (%) (Auto) 0.3 


 


Neutrophils # (Auto) 13.8 


 


Lymphocytes # (Auto) 2.4 


 


Monocytes # (Auto) 1.0 


 


Eosinophils # (Auto) 0.0 


 


Basophils # (Auto) 0.0 


 


CBC Comment DIFF FINAL 


 


Differential Comment  


 


Prothrombin Time 11.4 


 


Prothromb Time International


Ratio 1.1 


 


 


Activated Partial


Thromboplast Time 36.0 


 


 


Blood Urea Nitrogen 14 


 


Creatinine 0.96 


 


Random Glucose 91 


 


Total Protein 8.8 


 


Albumin 3.5 


 


Calcium Level 8.4 


 


Magnesium Level 2.0 


 


Alkaline Phosphatase 131 


 


Aspartate Amino Transf


(AST/SGOT) 21 


 


 


Alanine Aminotransferase


(ALT/SGPT) 23 


 


 


Total Bilirubin 0.5 


 


Sodium Level 133 


 


Potassium Level 3.8 


 


Chloride Level 96 


 


Carbon Dioxide Level 28.5 


 


Anion Gap 9 


 


Estimat Glomerular Filtration


Rate 91 


 


 


Lactic Acid Level 1.3 


 


Total Creatine Kinase 185 


 


Creatine Kinase MB 3.8 


 


Lipase 63 














 Date/Time


Source Procedure


Growth Status


 


 


 17 19:15


Blood Peripheral Aerobic Blood Culture


Pending Received


 


 17 19:15


Blood Peripheral Anaerobic Blood Culture


Pending Received








Result Diagram:  


17








Caprini VTE Risk Assessment


Caprini VTE Risk Assessment:  No/Low Risk (score <= 1)


Caprini Risk Assessment Model











 Point Value = 1          Point Value = 2  Point Value = 3  Point Value = 5


 


Age 41-60


Minor surgery


BMI > 25 kg/m2


Swollen legs


Varicose veins


Pregnancy or postpartum


History of unexplained or recurrent


   spontaneous 


Oral contraceptives or hormone


   replacement


Sepsis (< 1 month)


Serious lung disease, including


   pneumonia (< 1 month)


Abnormal pulmonary function


Acute myocardial infarction


Congestive heart failure (< 1 month)


History of inflammatory bowel disease


Medical patient at bed rest Age 61-74


Arthroscopic surgery


Major open surgery (> 45 min)


Laparoscopic surgery (> 45 min)


Malignancy


Confined to bed (> 72 hours)


Immobilizing plaster cast


Central venous access Age >= 75


History of VTE


Family history of VTE


Factor V Leiden


Prothrombin 45786U


Lupus anticoagulant


Anticardiolipin antibodies


Elevated serum homocysteine


Heparin-induced thrombocytopenia


Other congenital or acquired


   thrombophilia Stroke (< 1 month)


Elective arthroplasty


Hip, pelvis, or leg fracture


Acute spinal cord injury (< 1 month)








Prophylaxis Regimen











   Total Risk


Factor Score Risk Level Prophylaxis Regimen


 


0-1      Low Early ambulation


 


2 Moderate Order ONE of the following:


*Sequential Compression Device (SCD)


*Heparin 5000 units SQ BID


 


3-4 Higher Order ONE of the following medications:


*Heparin 5000 units SQ TID


*Enoxaparin/Lovenox 40 mg SQ daily (WT < 150 kg, CrCl > 30 mL/min)


*Enoxaparin/Lovenox 30 mg SQ daily (WT < 150 kg, CrCl > 10-29 mL/min)


*Enoxaparin/Lovenox 30 mg SQ BID (WT < 150 kg, CrCl > 30 mL/min)


AND/OR


*Sequential Compression Device (SCD)


 


5 or more Highest Order ONE of the following medications:


*Heparin 5000 units SQ TID (Preferred with Epidurals)


*Enoxaparin/Lovenox 40 mg SQ daily (WT < 150 kg, CrCl > 30 mL/min)


*Enoxaparin/Lovenox 30 mg SQ daily (WT < 150 kg, CrCl > 10-29 mL/min)


*Enoxaparin/Lovenox 30 mg SQ BID (WT < 150 kg, CrCl > 30 mL/min)


AND


*Sequential Compression Device (SCD)











Assessment and Plan


Problem List:  


(1) Sepsis


ICD Code:  A41.9 - Sepsis, unspecified organism


Status:  Acute


(2) Abscess of arm, left


ICD Code:  L02.414 - Cutaneous abscess of left upper limb


Status:  Acute


(3) HTN (hypertension)


ICD Code:  I10 - Essential (primary) hypertension


(4) IVDU (intravenous drug user)


ICD Code:  F19.90 - Other psychoactive substance use, unspecified, uncomplicated


(5) Tobacco abuse


ICD Code:  Z72.0 - Tobacco use


Status:  Acute


Assessment and Plan


A/P:


1.  Sepsis:  Temp 99.3, , WBC 17.2, Source-LUE abscess.  S/p Blood 

Cultures, IV Vanc/Zosyn.  Follow up cultures, continue IV Abx. 


2.  LUE Abscess:  pt denies IVDU, however track marks on exam, UDS pending.  CT 

LUE w/ significant inflammatory changes with likely fluid collection highly 

suspicious for abscess.  Ortho consulted by ER physician, however recommended 

eval by Hand Sx.  I spoke w/ Dr. Martines regarding pt's case, infection/abscess 

initiating above the elbow and extending to forearm, at this time no Hand 

Surgery intervention deemed necessary.  Will consult Gen Sx for further 

evaluation, likely surgical drainage. 


3.  HTN:  BP Uncontrolled.  170's on arrival, will monitor, optimize pain 

control, caution w/ h/o IVDU.  


4.  IVDU:  H/o IVDU, pt denies recent use however significant track marks on 

exam.  Urine Drug Screen pending.  Ativan prn as needed. 


5.  Tobacco Abuse:  Pt counselled.  NicoDerm as needed. 


6.  DVT Prophylaxis:  SCD/Teds. 


7.  Social work for d/c planning as needed. 


8.  Records/labs/imaging reviewed by me.  Case discussed at length w/ ER 

physician and Hand Sx.





Physician Certification


2 Midnight Certification Type:  Admission for Inpatient Services


Order for Inpatient Services


The services are ordered in accordance with Medicare regulations or non-

Medicare payer requirements, as applicable.  In the case of services not 

specified as inpatient-only, they are appropriately provided as inpatient 

services in accordance with the 2-midnight benchmark.


Estimated LOS (days):  2


 days is the estimated time the patient will need to remain in the hospital, 

assuming treatment plan goals are met and no additional complications.


Post-Hospital Plan:  Not yet determined





Problem Qualifiers





(1) Sepsis:  


Qualified Codes:  A41.9 - Sepsis, unspecified organism








Tere Knapp MD Dec 31, 2017 20:31

## 2017-12-31 NOTE — RADRPT
EXAM DATE/TIME:  12/31/2017 19:33 

 

[facility] COMPARISON:     

No previous studies available for comparison.

 

 

INDICATIONS :     

Left elbow pain; possible abscess.

                      

 

IV CONTRAST:     

97 cc Omnipaque 350 (iohexol) IV 

 

 

RADIATION DOSE:     

13.86 CTDIvol (mGy) 

 

 

MEDICAL HISTORY :     

Diabetes mellitus type 1.  

 

SURGICAL HISTORY :      

None. 

 

ENCOUNTER:      

Initial

 

ACUITY:      

1 day

 

PAIN SCALE:      

6/10

 

LOCATION:       

Left  elbow

 

TECHNIQUE:     

Volumetric scanning of the elbow was performed.  Using automated exposure control and adjustment of t
he mA and/or kV according to patient size, radiation dose was kept as low as reasonably achievable to
 obtain optimal diagnostic quality images.  DICOM format image data is available electronically for r
eview and comparison.  

 

FINDINGS:     

     No definite fracture is seen for technique. There is extensive subcutaneous edema involving the 
patient's left forearm anteromedially with one area of loculated fluid collection measures 4 cm in AP
 diameter at the appearance of an abscess. There are no signs of osteomyelitis.

 

CONCLUSION:     

Significant inflammatory changes with loculated fluid collection highly suspicious for abscess.

 

 

 

 RODRIGUEZ Bose MD on December 31, 2017 at 19:53           

Board Certified Radiologist.

 This report was verified electronically.

## 2017-12-31 NOTE — RADRPT
EXAM DATE/TIME:  12/31/2017 18:27 

 

[facility] COMPARISON:     

CHEST SINGLE AP, May 14, 2015, 23:38.

 

                     

INDICATIONS :     

Fever, cough, and left sided chest pain for three days.

                     

 

MEDICAL HISTORY :     

Hypertension.  Diabetes mellitus type II.        

 

SURGICAL HISTORY :     

None.   

 

ENCOUNTER:     

Initial                                        

 

ACUITY:     

3 days      

 

PAIN SCORE:     

10/10

LOCATION:     Left chest 

 

FINDINGS:     

The lungs are clear without infiltrate, nodule, or mass.  There is no appreciable pleural effusion fo
r technique.  Heart and mediastinum are unremarkable.

 

CONCLUSION:         No acute cardiopulmonary disease.

 

 

 RODRIGUEZ Bose MD on December 31, 2017 at 18:52           

Board Certified Radiologist.

 This report was verified electronically.

## 2017-12-31 NOTE — PD
HPI


Chief Complaint:  Skin Problem


Time Seen by Provider:  18:16


Travel History


International Travel<30 days:  No


Contact w/Intl Traveler<30days:  No


Traveled to known affect area:  No





History of Present Illness


HPI


32-year-old  male here with obvious cellulitis and question abscess to 

the left elbow, forearm and arm, with what appears to be acute sepsis.  Patient 

states it's been vomiting over the past several days.  He states fever, pain, 

and chills.  He has decreased range of motion secondary to pain in the left 

arm.  Patient denies IV drug use but there is obvious IV marks up and down both 

arms and anterior shoulders.  Denies history of MRSA.  Patient denies chest 

pain or abdominal pain.  Pain is 10 over 10 in the left arm.  The patient has 

no known drug allergies.





PFSH


Past Medical History


Hx Anticoagulant Therapy:  No


Arthritis:  No


Cancer:  No


Cardiovascular Problems:  No


Chemotherapy:  No


Chest Pain:  No


Congestive Heart Failure:  No


Cerebrovascular Accident:  No


Diabetes:  Yes


Diminished Hearing:  No


Endocrine:  No


Gastrointestinal Disorders:  No


Genitourinary:  No


Headaches:  No


Immune Disorder:  No


Musculoskeletal:  Yes


Neurologic:  No


Psychiatric:  No


Reproductive:  No


Respiratory:  No


Immunizations Current:  No


Migraines:  No


Radiation Therapy:  No


Seizures:  No





Past Surgical History


Abdominal Surgery:  No


Cardiac Surgery:  No


Ear Surgery:  No


Endocrine Surgery:  No


Eye Surgery:  No


Genitourinary Surgery:  No


Gynecologic Surgery:  No


Hysterectomy:  No


Neurologic Surgery:  No


Oral Surgery:  No


Pacemaker:  No


Thoracic Surgery:  No


Other Surgery:  Yes (right arm )





Social History


Alcohol Use:  No


Tobacco Use:  Yes (1/2 ppd)


Substance Use:  No





Allergies-Medications


(Allergen,Severity, Reaction):  


Coded Allergies:  


     No Known Allergies (Verified  Allergy, Unknown, 1/1/18)


Reported Meds & Prescriptions





Reported Meds & Active Scripts


Active


Reported


Dilaudid 8 mg (Hydromorphone HCl) 8 Mg Tab 8 Mg PO TID


Methocarbamol 750 Mg Tab 750 Mg PO BID








Review of Systems


Except as stated in HPI:  all other systems reviewed are Neg


General / Constitutional:  Positive: Fever, Chills


Eyes:  No: Visual changes


HENT:  No: Headaches


Cardiovascular:  No: Chest Pain or Discomfort


Respiratory:  No: Shortness of Breath


Gastrointestinal:  No: Abdominal Pain


Genitourinary:  No: Dysuria


Musculoskeletal:  Positive: Myalgias, Arthralgias, Limited ROM, Pain


Skin:  Positive Lesions, No Rash


Neurologic:  No: Weakness


Psychiatric:  No: Depression


Endocrine:  No: Polydipsia


Hematologic/Lymphatic:  No: Easy Bruising





Physical Exam


Narrative


GENERAL: Patient appears septic and in moderate distress.


SKIN: Warm and dry.  Normal color.  Normal turgor.  Patient has obvious 

puncture wounds consistent with IV drug use to both upper extremities and 

anterior shoulders.  Patient has obvious cellulitis, swelling, heat, and pain 

to the left elbow extending into the forearm and upward into the distal upper 

arm.


HEAD: Atraumatic. Normocephalic. 


EYES: Pupils equal and round. No scleral icterus. No injection or drainage. 


ENT: No nasal bleeding or discharge.  Mucous membranes pink and moist.  Pharynx 

is clear.  Airway is patent.


NECK: Trachea midline.  Supple nontender.


CARDIOVASCULAR: Tachycardic rate and normal rhythm.  No murmurs gallops or rubs 

appreciated.


RESPIRATORY: No accessory muscle use. Clear to auscultation. Breath sounds 

equal bilaterally. 


GASTROINTESTINAL: Abdomen soft, non-tender, nondistended. Hepatic and splenic 

margins not palpable. 


MUSCULOSKELETAL: Extremities without clubbing, cyanosis, or edema. No obvious 

deformities. 


NEUROLOGICAL: Awake and alert. No obvious cranial nerve deficits.  Motor 

grossly within normal limits. Five out of 5 muscle strength in the arms and 

legs.  Normal speech.


PSYCHIATRIC: Appropriate mood and affect; insight and judgment normal.





Data


Data


Last Documented VS





Vital Signs








  Date Time  Temp Pulse Resp B/P (MAP) Pulse Ox O2 Delivery O2 Flow Rate FiO2


 


12/31/17 20:00 101.2 108 20 119/61 (80) 97   


 


12/31/17 18:10      Room Air  








Orders





 Orders


Sepsis Workup Initiated (12/31/17 )


Electrocardiogram (12/31/17 18:19)


Complete Blood Count With Diff (12/31/17 18:19)


Comprehensive Metabolic Panel (12/31/17 18:19)


Prothrombin Time / Inr (Pt) (12/31/17 18:19)


Act Partial Throm Time (Ptt) (12/31/17 18:19)


Lactic Acid Sepsis Protocol (12/31/17 18:19)


Magnesium (Mg) (12/31/17 18:19)


Lipase (12/31/17 18:19)


Ckmb (Isoenzyme) Profile (12/31/17 18:19)


Urinalysis - C+S If Indicated (12/31/17 18:19)


Blood Culture (12/31/17 18:19)


Chest, Single Ap (12/31/17 18:19)


Blood Glucose (12/31/17 18:19)


Ecg Monitoring (12/31/17 18:19)


Iv Access Insert/Monitor (12/31/17 18:19)


Oximetry (12/31/17 18:19)


Oxygen Administration (12/31/17 18:19)


Ondansetron Inj (Zofran Inj) (12/31/17 18:30)


Piperacil-Tazo 4.5 Gm Premix (Zosyn 4.5 (12/31/17 18:19)


Clindamycin Inj (Cleocin Inj) (12/31/17 18:19)


Vancomycin Inj (Vancomycin Inj) (12/31/17 18:19)


Sodium Chlor 0.9% 1000 Ml Inj (Ns 1000 M (12/31/17 18:19)


Sodium Chlor 0.9% 1000 Ml Inj (Ns 1000 M (12/31/17 18:19)


Morphine Inj (Morphine Inj) (12/31/17 18:30)


Ct Elbow W Iv Contrast (12/31/17 )


Tetanus/Diphtheria Tox Adult (Tetanus/Di (12/31/17 18:30)


Iohexol 350 Inj (Omnipaque 350 Inj) (12/31/17 19:35)


CKMB% (12/31/17 18:55)


CKMB (12/31/17 18:55)


Lidocai-Epi 2%-1:100,000 Inj (Xylocaine- (12/31/17 20:15)


Ketorolac Inj (Toradol Inj) (12/31/17 20:15)


Admit Order (Ed Use Only) (12/31/17 20:25)





Labs





Laboratory Tests








Test


  12/31/17


18:55


 


White Blood Count 17.2 TH/MM3 


 


Red Blood Count 4.79 MIL/MM3 


 


Hemoglobin 14.0 GM/DL 


 


Hematocrit 41.6 % 


 


Mean Corpuscular Volume 86.8 FL 


 


Mean Corpuscular Hemoglobin 29.1 PG 


 


Mean Corpuscular Hemoglobin


Concent 33.5 % 


 


 


Red Cell Distribution Width 13.7 % 


 


Platelet Count 275 TH/MM3 


 


Mean Platelet Volume 7.3 FL 


 


Neutrophils (%) (Auto) 80.0 % 


 


Lymphocytes (%) (Auto) 13.8 % 


 


Monocytes (%) (Auto) 5.6 % 


 


Eosinophils (%) (Auto) 0.3 % 


 


Basophils (%) (Auto) 0.3 % 


 


Neutrophils # (Auto) 13.8 TH/MM3 


 


Lymphocytes # (Auto) 2.4 TH/MM3 


 


Monocytes # (Auto) 1.0 TH/MM3 


 


Eosinophils # (Auto) 0.0 TH/MM3 


 


Basophils # (Auto) 0.0 TH/MM3 


 


CBC Comment DIFF FINAL 


 


Differential Comment  


 


Prothrombin Time 11.4 SEC 


 


Prothromb Time International


Ratio 1.1 RATIO 


 


 


Activated Partial


Thromboplast Time 36.0 SEC 


 


 


Blood Urea Nitrogen 14 MG/DL 


 


Creatinine 0.96 MG/DL 


 


Random Glucose 91 MG/DL 


 


Total Protein 8.8 GM/DL 


 


Albumin 3.5 GM/DL 


 


Calcium Level 8.4 MG/DL 


 


Magnesium Level 2.0 MG/DL 


 


Alkaline Phosphatase 131 U/L 


 


Aspartate Amino Transf


(AST/SGOT) 21 U/L 


 


 


Alanine Aminotransferase


(ALT/SGPT) 23 U/L 


 


 


Total Bilirubin 0.5 MG/DL 


 


Sodium Level 133 MEQ/L 


 


Potassium Level 3.8 MEQ/L 


 


Chloride Level 96 MEQ/L 


 


Carbon Dioxide Level 28.5 MEQ/L 


 


Anion Gap 9 MEQ/L 


 


Estimat Glomerular Filtration


Rate 91 ML/MIN 


 


 


Lactic Acid Level 1.3 mmol/L 


 


Total Creatine Kinase 185 U/L 


 


Creatine Kinase MB 3.8 NG/ML 


 


Lipase 63 U/L 











MDM


Medical Decision Making


Medical Screen Exam Complete:  Yes


Emergency Medical Condition:  Yes


Differential Diagnosis


Sepsis.  Cellulitis.  Abscess.  Osteomyelitis.


Narrative Course


Sepsis protocol was initiated.


IV is obtained and labs ordered including CBC, CMP, lactic acid, and cultures 2

, and urinalysis.


Patient is given normal saline bolus of 2 L.


EKG is performed showing sinus tachycardia with a rate of 112.


Chest x-ray is ordered as well as CT of the left elbow to evaluate for abscess 

versus cellulitis.


Patient is given 4.5 g Zosyn IV as well as 1000 mg vancomycin IV, as well as 

900 mg clindamycin IV.


Patient is given 2 mg morphine IV.


0.5 mg tetanus IM was given.


CBC shows leukocytosis of 17.2.  80% neutrophils.


CMP is unremarkable except for sodium 133, chloride 96.  Calcium is 8.4.  

Alkaline phosphatase is 131, lipase is low at 63.


Lactic acid is not elevated.


CT shows almost 4 cm loculated abscess to the left distal medial upper arm.


Discussed the findings with Dr. Rees recommends admission and surgical consult 

for drainage.


Call was placed to Dr. Knapp for admission.





Sepsis Criteria


SIRS Criteria (2 or more):  Heart rate over 90, WBC > 87687, < 4000 or > 10% 

bands


Sepsis Criteria (SIRS+source):  Infect source susp/known


Criteria Outcome:  Meets SIRS criteria, Meets sepsis criteria





Diagnosis





 Primary Impression:  


 Sepsis


 Qualified Codes:  A41.9 - Sepsis, unspecified organism


 Additional Impressions:  


 Abscess of arm, left


 IV drug user





Admitting Information


Admitting Physician Requests:  Admit


Condition:  Stable











Ananda Locke Dec 31, 2017 18:26

## 2018-01-01 VITALS
DIASTOLIC BLOOD PRESSURE: 57 MMHG | OXYGEN SATURATION: 97 % | SYSTOLIC BLOOD PRESSURE: 124 MMHG | RESPIRATION RATE: 20 BRPM | HEART RATE: 113 BPM | TEMPERATURE: 102.2 F

## 2018-01-01 VITALS
SYSTOLIC BLOOD PRESSURE: 119 MMHG | HEART RATE: 90 BPM | RESPIRATION RATE: 16 BRPM | DIASTOLIC BLOOD PRESSURE: 56 MMHG | OXYGEN SATURATION: 96 % | TEMPERATURE: 100.3 F

## 2018-01-01 VITALS
SYSTOLIC BLOOD PRESSURE: 111 MMHG | HEART RATE: 88 BPM | OXYGEN SATURATION: 98 % | TEMPERATURE: 96.3 F | RESPIRATION RATE: 16 BRPM | DIASTOLIC BLOOD PRESSURE: 55 MMHG

## 2018-01-01 VITALS
OXYGEN SATURATION: 96 % | RESPIRATION RATE: 16 BRPM | SYSTOLIC BLOOD PRESSURE: 95 MMHG | DIASTOLIC BLOOD PRESSURE: 55 MMHG | TEMPERATURE: 96.5 F | HEART RATE: 69 BPM

## 2018-01-01 VITALS — TEMPERATURE: 98.4 F

## 2018-01-01 VITALS
DIASTOLIC BLOOD PRESSURE: 57 MMHG | HEART RATE: 72 BPM | SYSTOLIC BLOOD PRESSURE: 105 MMHG | TEMPERATURE: 96 F | RESPIRATION RATE: 18 BRPM | OXYGEN SATURATION: 97 %

## 2018-01-01 PROCEDURE — 0J9F0ZX DRAINAGE OF LEFT UPPER ARM SUBCUTANEOUS TISSUE AND FASCIA, OPEN APPROACH, DIAGNOSTIC: ICD-10-PCS | Performed by: PLASTIC SURGERY

## 2018-01-01 RX ADMIN — VANCOMYCIN HYDROCHLORIDE SCH MLS/HR: 1 INJECTION, SOLUTION INTRAVENOUS at 23:51

## 2018-01-01 RX ADMIN — STANDARDIZED SENNA CONCENTRATE AND DOCUSATE SODIUM SCH TAB: 8.6; 5 TABLET, FILM COATED ORAL at 23:52

## 2018-01-01 RX ADMIN — VANCOMYCIN HYDROCHLORIDE SCH MLS/HR: 1 INJECTION, SOLUTION INTRAVENOUS at 12:40

## 2018-01-01 RX ADMIN — ACETAMINOPHEN PRN MG: 325 TABLET ORAL at 10:23

## 2018-01-01 RX ADMIN — PHENYTOIN SODIUM SCH MLS/HR: 50 INJECTION INTRAMUSCULAR; INTRAVENOUS at 15:00

## 2018-01-01 RX ADMIN — CEFEPIME SCH MLS/HR: 1 INJECTION, POWDER, FOR SOLUTION INTRAMUSCULAR; INTRAVENOUS at 08:26

## 2018-01-01 RX ADMIN — Medication SCH ML: at 08:27

## 2018-01-01 RX ADMIN — STANDARDIZED SENNA CONCENTRATE AND DOCUSATE SODIUM SCH TAB: 8.6; 5 TABLET, FILM COATED ORAL at 08:26

## 2018-01-01 RX ADMIN — HYDROMORPHONE HYDROCHLORIDE PRN MG: 4 INJECTION, SOLUTION INTRAMUSCULAR; INTRAVENOUS; SUBCUTANEOUS at 23:50

## 2018-01-01 RX ADMIN — PHENYTOIN SODIUM SCH MLS/HR: 50 INJECTION INTRAMUSCULAR; INTRAVENOUS at 08:26

## 2018-01-01 RX ADMIN — Medication SCH ML: at 21:00

## 2018-01-01 RX ADMIN — HYDROMORPHONE HYDROCHLORIDE PRN MG: 4 INJECTION, SOLUTION INTRAMUSCULAR; INTRAVENOUS; SUBCUTANEOUS at 10:07

## 2018-01-01 RX ADMIN — CEFEPIME SCH MLS/HR: 1 INJECTION, POWDER, FOR SOLUTION INTRAMUSCULAR; INTRAVENOUS at 23:51

## 2018-01-01 RX ADMIN — VANCOMYCIN HYDROCHLORIDE SCH MLS/HR: 1 INJECTION, SOLUTION INTRAVENOUS at 06:08

## 2018-01-01 NOTE — HHI.PR
Subjective


Remarks


Patient seen and examined this am.  Tachycardic, Tmax 102.2.  Was sleeping 

comfortably, he awakens easily.  Then began shaking and crying in pain.  States 

his arm is burning.  Gave me prescription that shows he take hydromorphone 8 mg 

q6hrs for his chronic back pain.  It was filled in November 2017.  States he 

cant sleep, is sweating, and feel bad.





Objective





Vital Signs








  Date Time  Temp Pulse Resp B/P (MAP) Pulse Ox O2 Delivery O2 Flow Rate FiO2


 


1/1/18 01:51 98.4       


 


1/1/18 00:34 102.2 113 20 124/57 (79) 97   


 


12/31/17 21:20        


 


12/31/17 20:00 101.2 108 20 119/61 (80) 97   


 


12/31/17 18:10 99.3 123 20 175/81 (112) 98 Room Air  














I/O      


 


 12/31/17 12/31/17 12/31/17 1/1/18 1/1/18 1/1/18





 07:00 15:00 23:00 07:00 15:00 23:00


 


Intake Total   250 ml   


 


Balance   250 ml   


 


      


 


Intake IV Total   250 ml   


 


# Voids    1  








Result Diagram:  


12/31/17 1855                                                                  

              12/31/17 1855





Imaging





Last Impressions








Chest X-Ray 12/31/17 1819 Signed





Impressions: 





 Service Date/Time:  Sunday, December 31, 2017 18:27 - CONCLUSION: No acute 





 cardiopulmonary disease.    RODRIGUEZ Bose MD 


 


Upper Extremity CT 12/31/17 0000 Signed





Impressions: 





 Service Date/Time:  Sunday, December 31, 2017 19:33 - CONCLUSION:  Significant 





 inflammatory changes with loculated fluid collection highly suspicious for 





 abscess.     RODRIGUEZ Bose MD 








Objective Remarks


GENERAL: Appears uncomfortable


SKIN: Warm and dry. Perspiring.


HEAD: Normocephalic.


EYES: No scleral icterus. No injection or drainage. 


NECK: Supple, trachea midline. No JVD or lymphadenopathy.


CARDIOVASCULAR: Regular rate and rhythm without murmurs, gallops, or rubs. 


RESPIRATORY: Breath sounds equal bilaterally. No accessory muscle use.


GASTROINTESTINAL: Abdomen soft, non-tender, nondistended. 


MUSCULOSKELETAL: No cyanosis, or edema.  Upper extremity swelling above the 

elbow that extends to forearm. Malodorous, significant erythema noted. Track 

marks on both arms bilaterally. 


BACK: Nontender without obvious deformity. No CVA tenderness.








A/P


Problem List:  


(1) IV drug user


ICD Code:  F19.90 - Other psychoactive substance use, unspecified, uncomplicated


Status:  Acute


(2) HTN (hypertension)


ICD Code:  I10 - Essential (primary) hypertension


(3) Tobacco abuse


ICD Code:  Z72.0 - Tobacco use


Status:  Acute


(4) Sepsis


ICD Code:  A41.9 - Sepsis, unspecified organism


Status:  Acute


(5) Abscess of arm, left


ICD Code:  L02.414 - Cutaneous abscess of left upper limb


Status:  Acute


Assessment and Plan


32-year-old male with a medical significant for IV drug use who presents with 

sepsis with source left upper extremity





Sepsis


- Leukocytosis, tachycardia, with fever, source is upper [place] abscess


- Patient is s/p Vanco and Zosyn in the ER


- Continued on Vanco and cefepime 1/1/18


- Blood cultures obtained and are pending


- See below





Left upper extremity abscess


- See imaging above.  He denies IV drug abuse although track are present.


- Was initially consulted by the ER, they recommended that the patient be 

evaluated by hand surgery.  Hand surgery was contacted, no hand surgery 

intervention and was deemed necessary.


- Gen. surgery was contacted


- he is on chronic opiates, has prescription paper with him, written by Dr. Abraham Uribe in [place], it was also verified during his last hospitalization 

in March: Dilaudid 8 mg q8 hrs, will order dilaudid 6 mg IV q8 will add 

morphine for breakthrough pain





Hypertension


- Controlled





IV drug abuse


- Denies 





Tobacco abuse


- Counseled





DVT prophylaxis: Bilateral SCDs


Discharge Planning


DC further workup which includes surgical evaluation.





Problem Qualifiers





(1) Sepsis:  


Qualified Codes:  A41.9 - Sepsis, unspecified organism








Rajani Lima MD Jan 1, 2018 07:13

## 2018-01-01 NOTE — PD.CONS
History of Present Illness


Service


Hand surgery


Consult Requested By


Hospitalist


Reason for Consult


Left upper arm abscess


Primary Care Physician


No Primary Care Physician


Diagnoses:  


History of Present Illness


32M RHD PMH of Tobacco Abuse and h/o IVDU who presented to the ER w/ complaints 

of left arm pain and swelling x 5 days.





Reports progressive swelling/pain to left upper arm, now involving elbow and 

forearm.  Pain is constant, severe, 10/10, radiates up and down left arm.  No 

alleviating factors, worse w/ movement.  Denies IVDU.  On arrival, /81, 

, O2 sat 98% on RA, Temp 99.3.  W WBC 17.2.  CT LUE with significant 

inflammatory changes, loculated fluid collection highly suspicious for abscess.

  S/p Blood Cultures, Vanc/Zosyn in ER.





Review of Systems


Otherwise review of systems noncontributory to presenting complaint





Past Family Social History


Allergies:  


Coded Allergies:  


     No Known Allergies (Verified  Allergy, Unknown, 1/1/18)


Past Medical History


PMH:  Tobacco Abuse and h/o IVDU


PSH: "Right Arm Surgery" as a child


NKDA


PFH: No h/o DM or CAD, otherwise noncontributory to presenting complaint


SH: Positive for alcohol, cigarettes, recent cocaine use. Smokes 1/2-1ppd.  H/o 

IVDU, denies recent use.





Physical Exam


Vital Signs





Vital Signs








  Date Time  Temp Pulse Resp B/P (MAP) Pulse Ox O2 Delivery O2 Flow Rate FiO2


 


1/1/18 12:00 96.5 69 16 95/55 (68) 96   


 


1/1/18 10:07 100.3 90 16 119/56 (77) 96   


 


1/1/18 01:51 98.4       


 


1/1/18 00:34 102.2 113 20 124/57 (79) 97   


 


12/31/17 21:20        


 


12/31/17 20:00 101.2 108 20 119/61 (80) 97   


 


12/31/17 18:10 99.3 123 20 175/81 (112) 98 Room Air  





No acute distress, the patient visibly uncomfortable due to pain of left arm.


Alert and oriented 3


PERRLA


Moist mucous membranes


Respirations nonlabored


Left upper extremity with erythema extending from proximal third upper arm to 

proximal third forearm


Induration from proximal medial elbow to upper arm


3 cm area of fluctuance roughly 6 cm above medial elbow


Patient able to make composite fist, as well as open hand left hand fully.


Patient reporting subjective decreased sensation to medial and lateral palm


Sensation intact to light touch distal digits 5


APB and abductors 4 out of 5


Physical Exam


GENERAL: This is a well-nourished, well-developed patient, in no apparent 

distress.


SKIN: No rashes, ecchymoses or lesions. Cool and dry.


HEAD: Atraumatic. Normocephalic. No temporal or scalp tenderness.


EYES: Pupils equal round and reactive. Extraocular motions intact. No scleral 

icterus. No injection or drainage. 


ENT: Nose without bleeding, purulent drainage or septal hematoma. Throat 

without erythema, tonsillar hypertrophy or exudate. Uvula midline. Airway 

patent.


NECK: Trachea midline. No JVD or lymphadenopathy. Supple, nontender, no 

meningeal signs.


CARDIOVASCULAR: Regular rate and rhythm without murmurs, gallops, or rubs. 


RESPIRATORY: Clear to auscultation. Breath sounds equal bilaterally. No wheezes

, rales, or rhonchi.  


GASTROINTESTINAL: Abdomen soft, non-tender, nondistended. No hepato-splenomegaly

, or palpable masses. No guarding.


MUSCULOSKELETAL: Extremities without clubbing, cyanosis, or edema. No joint 

tenderness, effusion, or edema noted. No calf tenderness. Negative Homans sign 

bilaterally.


NEUROLOGICAL: Awake and alert. Cranial nerves II through XII intact.  Motor and 

sensory grossly within normal limits. Five out of 5 muscle strength in all 

muscle groups.  Normal speech.


Laboratory





Laboratory Tests








Test


  12/31/17


18:55


 


White Blood Count 17.2 


 


Red Blood Count 4.79 


 


Hemoglobin 14.0 


 


Hematocrit 41.6 


 


Mean Corpuscular Volume 86.8 


 


Mean Corpuscular Hemoglobin 29.1 


 


Mean Corpuscular Hemoglobin


Concent 33.5 


 


 


Red Cell Distribution Width 13.7 


 


Platelet Count 275 


 


Mean Platelet Volume 7.3 


 


Neutrophils (%) (Auto) 80.0 


 


Lymphocytes (%) (Auto) 13.8 


 


Monocytes (%) (Auto) 5.6 


 


Eosinophils (%) (Auto) 0.3 


 


Basophils (%) (Auto) 0.3 


 


Neutrophils # (Auto) 13.8 


 


Lymphocytes # (Auto) 2.4 


 


Monocytes # (Auto) 1.0 


 


Eosinophils # (Auto) 0.0 


 


Basophils # (Auto) 0.0 


 


CBC Comment DIFF FINAL 


 


Differential Comment  


 


Prothrombin Time 11.4 


 


Prothromb Time International


Ratio 1.1 


 


 


Activated Partial


Thromboplast Time 36.0 


 


 


Blood Urea Nitrogen 14 


 


Creatinine 0.96 


 


Random Glucose 91 


 


Total Protein 8.8 


 


Albumin 3.5 


 


Calcium Level 8.4 


 


Magnesium Level 2.0 


 


Alkaline Phosphatase 131 


 


Aspartate Amino Transf


(AST/SGOT) 21 


 


 


Alanine Aminotransferase


(ALT/SGPT) 23 


 


 


Total Bilirubin 0.5 


 


Sodium Level 133 


 


Potassium Level 3.8 


 


Chloride Level 96 


 


Carbon Dioxide Level 28.5 


 


Anion Gap 9 


 


Estimat Glomerular Filtration


Rate 91 


 


 


Lactic Acid Level 1.3 


 


Total Creatine Kinase 185 


 


Creatine Kinase MB 3.8 


 


Lipase 63 














 Date/Time


Source Procedure


Growth Status


 


 


 12/31/17 19:15


Blood Peripheral Aerobic Blood Culture - Preliminary


NO GROWTH IN 1 DAY Resulted


 


 12/31/17 19:15


Blood Peripheral Anaerobic Blood Culture - Preliminary


NO GROWTH IN 1 DAY Resulted








Result Diagram:  


12/31/17 1855 12/31/17 1855





Imaging


CT of left upper extremity images personally we reviewed by me.  They 

demonstrate a 4 cm in AP dimension multiloculated, complicated, septated 

abscess abutting and surrounding the brachial artery, extending to mid upper 

arm proximally and stopping just superior to the anterior cubital fossa 

inferiorly.





Assessment and Plan


Problem List:  


(1) Cellulitis of forearm, right


ICD Codes:  L03.113 - Cellulitis of forearm, right


Status:  Acute


(2) Abscess of arm, left


ICD Codes:  L02.414 - Cutaneous abscess of left upper limb


Status:  Acute


Assessment and Plan


32-year-old male with 5 day history of worsening left upper arm cellulitis, now 

admitted for large complicated abscess of the upper arm.


After a lengthy discussion had with the patient regarding his condition, the 

risks benefits and alternatives to various treatment options were discussed.


I recommendation was for taking him to the operating room for incision and 

drainage of his abscess.


Informed consent was obtained.


Patient understands that given the extent, complexity, and proximity of the 

abscess to his median nerve and brachial artery, he is at risk for injury to 

these structures.


Patient expresses understanding and elects to assume the risks of operative 

drainage.











Augusto Zuniga MD Jan 1, 2018 12:41

## 2018-01-01 NOTE — PD.OP
__________________________________________________





Operative Report


Preoperative Diagnosis:  


(1) Abscess of arm, left


Postoperative Diagnosis:  


(1) Abscess of arm, left


Procedure:


Incision and drainage of complex loculated left upper arm abscess (4 x 8 x 2 cm)


Anesthesia:


Gen.


Surgeon:


Augusto Zuniga


Assistant(s):


see records


Operation and Findings:


Date of procedure 1/1/18


This is a 32-year-old man presenting with a 5 day history of worsening left 

upper arm cellulitis, now complicated by large complicated abscess.  Risks 

benefits and alternatives were discussed.  Informed consent was obtained.  

Patient expresses understanding and elects to assume the risks of operative 

drainage.


The patient received a doses of vancomycin clindamycin and Zosyn in the 

emergency department last night, followed by scheduled doses of vancomycin and 

cefepime.


The patient was taken to the operating room.  All pressure points were padded.  

After the smooth induction of general anesthesia, the surgical site was prepped 

and draped in the usual sterile fashion.  After appropriate padding, a sterile 

upper extremity tourniquet was placed the upper arm was held straight up in the 

air while concomitant brachial artery pressure was utilized for 3 minutes.  

Following this the tourniquet was inflated to 250 mmHg.  An incision was made 

with a 15 blade roughly 2 cm long, over the area where fluctuance was 

appreciated.  This was roughly 4 cm proximal to the antecubital fold.  The 

abscess was easily encountered after gentle superficial spreading with mosquito 

clamp.  There was roughly 10-15 mls of purulence.  Cultures were taken 3.  The 

tourniquet was then let down with 3 minutes of tourniquet time.  The brachial 

artery was then palpated just below the deepest extent of the abscess cavity 

though covered by adequate soft tissue.  Gentle digital spreading allowed 

rupture of the abscess septations inferiorly, medially, posteriorly, and 

especially superiorly.  The abscess cavity extended roughly 8 cm proximally.  

The Bovie cautery was then used to Bovie on top of the surgeon's finger while 

it was held in the abscess cavity so that there was roughly a 4 cm long 

incision.  The abscess cavity was then pulse lavaged with roughly 1 L of 

gentamicin irrigation.  Hemostasis was obtained.  There appeared to be no 

arterial bleeding at any point in the case.  The abscess cavity was packed with 

half-inch iodoform gauze.  This was then dressed with dry 4 x 4 gauze followed 

by an ABG pad, soft roll and an Ace bandage from the patient's hand to the 

proximal upper arm.  An inferior window was created to allow for monitoring of 

the erythema.  The patient was awoken smoothly from anesthesia and arrive 

stably and doing well to the PACU.  All needle sponge instrument counts were 

correct 2.











Augusto Zuniga MD Jan 1, 2018 14:31

## 2018-01-02 VITALS
RESPIRATION RATE: 18 BRPM | TEMPERATURE: 95.4 F | DIASTOLIC BLOOD PRESSURE: 60 MMHG | OXYGEN SATURATION: 98 % | SYSTOLIC BLOOD PRESSURE: 102 MMHG | HEART RATE: 79 BPM

## 2018-01-02 VITALS
TEMPERATURE: 96.6 F | SYSTOLIC BLOOD PRESSURE: 119 MMHG | HEART RATE: 76 BPM | RESPIRATION RATE: 19 BRPM | OXYGEN SATURATION: 98 % | DIASTOLIC BLOOD PRESSURE: 53 MMHG

## 2018-01-02 VITALS — OXYGEN SATURATION: 97 %

## 2018-01-02 VITALS
RESPIRATION RATE: 16 BRPM | HEART RATE: 89 BPM | DIASTOLIC BLOOD PRESSURE: 58 MMHG | SYSTOLIC BLOOD PRESSURE: 108 MMHG | OXYGEN SATURATION: 97 % | TEMPERATURE: 98.8 F

## 2018-01-02 VITALS
HEART RATE: 98 BPM | DIASTOLIC BLOOD PRESSURE: 59 MMHG | OXYGEN SATURATION: 97 % | SYSTOLIC BLOOD PRESSURE: 114 MMHG | RESPIRATION RATE: 16 BRPM | TEMPERATURE: 99 F

## 2018-01-02 VITALS
TEMPERATURE: 98.7 F | HEART RATE: 72 BPM | RESPIRATION RATE: 19 BRPM | SYSTOLIC BLOOD PRESSURE: 130 MMHG | DIASTOLIC BLOOD PRESSURE: 61 MMHG | OXYGEN SATURATION: 98 %

## 2018-01-02 VITALS — OXYGEN SATURATION: 99 %

## 2018-01-02 VITALS
RESPIRATION RATE: 19 BRPM | OXYGEN SATURATION: 100 % | TEMPERATURE: 96.6 F | HEART RATE: 68 BPM | DIASTOLIC BLOOD PRESSURE: 58 MMHG | SYSTOLIC BLOOD PRESSURE: 110 MMHG

## 2018-01-02 LAB
ALBUMIN SERPL-MCNC: 2.3 GM/DL (ref 3.4–5)
ALP SERPL-CCNC: 92 U/L (ref 45–117)
ALT SERPL-CCNC: 27 U/L (ref 12–78)
AST SERPL-CCNC: 31 U/L (ref 15–37)
BASOPHILS # BLD AUTO: 0 TH/MM3 (ref 0–0.2)
BASOPHILS NFR BLD: 0.7 % (ref 0–2)
BILIRUB SERPL-MCNC: 0.2 MG/DL (ref 0.2–1)
BUN SERPL-MCNC: 10 MG/DL (ref 7–18)
BUN SERPL-MCNC: 8 MG/DL (ref 7–18)
CALCIUM SERPL-MCNC: 7.8 MG/DL (ref 8.5–10.1)
CALCIUM SERPL-MCNC: 8 MG/DL (ref 8.5–10.1)
CHLORIDE SERPL-SCNC: 106 MEQ/L (ref 98–107)
CHLORIDE SERPL-SCNC: 107 MEQ/L (ref 98–107)
CREAT SERPL-MCNC: 0.65 MG/DL (ref 0.6–1.3)
CREAT SERPL-MCNC: 0.73 MG/DL (ref 0.6–1.3)
EOSINOPHIL # BLD: 1 TH/MM3 (ref 0–0.4)
EOSINOPHIL NFR BLD: 16.2 % (ref 0–4)
ERYTHROCYTE [DISTWIDTH] IN BLOOD BY AUTOMATED COUNT: 15.8 % (ref 11.6–17.2)
GFR SERPLBLD BASED ON 1.73 SQ M-ARVRAT: 125 ML/MIN (ref 89–?)
GFR SERPLBLD BASED ON 1.73 SQ M-ARVRAT: 143 ML/MIN (ref 89–?)
GLUCOSE SERPL-MCNC: 109 MG/DL (ref 74–106)
GLUCOSE SERPL-MCNC: 149 MG/DL (ref 74–106)
HCO3 BLD-SCNC: 26.4 MEQ/L (ref 21–32)
HCO3 BLD-SCNC: 30.4 MEQ/L (ref 21–32)
HCT VFR BLD CALC: 38.2 % (ref 39–51)
HGB BLD-MCNC: 12.9 GM/DL (ref 13–17)
LYMPHOCYTES # BLD AUTO: 1.5 TH/MM3 (ref 1–4.8)
LYMPHOCYTES NFR BLD AUTO: 24.2 % (ref 9–44)
MCH RBC QN AUTO: 31.3 PG (ref 27–34)
MCHC RBC AUTO-ENTMCNC: 33.9 % (ref 32–36)
MCV RBC AUTO: 92.5 FL (ref 80–100)
MONOCYTE #: 0.7 TH/MM3 (ref 0–0.9)
MONOCYTES NFR BLD: 10.5 % (ref 0–8)
NEUTROPHILS # BLD AUTO: 3.1 TH/MM3 (ref 1.8–7.7)
NEUTROPHILS NFR BLD AUTO: 48.4 % (ref 16–70)
PLATELET # BLD: 331 TH/MM3 (ref 150–450)
PMV BLD AUTO: 7.3 FL (ref 7–11)
PROT SERPL-MCNC: 6.1 GM/DL (ref 6.4–8.2)
RBC # BLD AUTO: 4.13 MIL/MM3 (ref 4.5–5.9)
SODIUM SERPL-SCNC: 141 MEQ/L (ref 136–145)
SODIUM SERPL-SCNC: 142 MEQ/L (ref 136–145)
WBC # BLD AUTO: 6.4 TH/MM3 (ref 4–11)

## 2018-01-02 RX ADMIN — INFLUENZA A VIRUS A/CALIFORNIA/7/2009 X-179A (H1N1) ANTIGEN (FORMALDEHYDE INACTIVATED), INFLUENZA A VIRUS A/HONG KONG/4801/2014 X-263B (H3N2) ANTIGEN (FORMALDEHYDE INACTIVATED), INFLUENZA B VIRUS B/PHUKET/3073/2013 ANTIGEN (FORMALDEHYDE INACTIVATED), AND INFLUENZA B VIRUS B/BRISBANE/60/2008 ANTIGEN (FORMALDEHYDE INACTIVATED) ONE ML: 15; 15; 15; 15 INJECTION, SUSPENSION INTRAMUSCULAR at 10:00

## 2018-01-02 RX ADMIN — STANDARDIZED SENNA CONCENTRATE AND DOCUSATE SODIUM SCH TAB: 8.6; 5 TABLET, FILM COATED ORAL at 20:40

## 2018-01-02 RX ADMIN — MORPHINE SULFATE PRN MG: 2 INJECTION, SOLUTION INTRAMUSCULAR; INTRAVENOUS at 23:46

## 2018-01-02 RX ADMIN — Medication SCH ML: at 09:03

## 2018-01-02 RX ADMIN — CEFEPIME SCH MLS/HR: 1 INJECTION, POWDER, FOR SOLUTION INTRAMUSCULAR; INTRAVENOUS at 20:39

## 2018-01-02 RX ADMIN — STANDARDIZED SENNA CONCENTRATE AND DOCUSATE SODIUM SCH TAB: 8.6; 5 TABLET, FILM COATED ORAL at 09:00

## 2018-01-02 RX ADMIN — Medication SCH ML: at 20:39

## 2018-01-02 RX ADMIN — PHENYTOIN SODIUM SCH MLS/HR: 50 INJECTION INTRAMUSCULAR; INTRAVENOUS at 03:00

## 2018-01-02 RX ADMIN — VANCOMYCIN HYDROCHLORIDE SCH MLS/HR: 1 INJECTION, SOLUTION INTRAVENOUS at 06:00

## 2018-01-02 RX ADMIN — INFLUENZA A VIRUS A/CALIFORNIA/7/2009 X-179A (H1N1) ANTIGEN (FORMALDEHYDE INACTIVATED), INFLUENZA A VIRUS A/HONG KONG/4801/2014 X-263B (H3N2) ANTIGEN (FORMALDEHYDE INACTIVATED), INFLUENZA B VIRUS B/PHUKET/3073/2013 ANTIGEN (FORMALDEHYDE INACTIVATED), AND INFLUENZA B VIRUS B/BRISBANE/60/2008 ANTIGEN (FORMALDEHYDE INACTIVATED) ONE ML: 15; 15; 15; 15 INJECTION, SUSPENSION INTRAMUSCULAR at 08:55

## 2018-01-02 RX ADMIN — MORPHINE SULFATE PRN MG: 2 INJECTION, SOLUTION INTRAMUSCULAR; INTRAVENOUS at 15:01

## 2018-01-02 RX ADMIN — PHENYTOIN SODIUM SCH MLS/HR: 50 INJECTION INTRAMUSCULAR; INTRAVENOUS at 20:41

## 2018-01-02 RX ADMIN — HYDROMORPHONE HYDROCHLORIDE PRN MG: 4 INJECTION, SOLUTION INTRAMUSCULAR; INTRAVENOUS; SUBCUTANEOUS at 20:48

## 2018-01-02 RX ADMIN — HYDROMORPHONE HYDROCHLORIDE PRN MG: 4 INJECTION, SOLUTION INTRAMUSCULAR; INTRAVENOUS; SUBCUTANEOUS at 08:57

## 2018-01-02 RX ADMIN — CEFEPIME SCH MLS/HR: 1 INJECTION, POWDER, FOR SOLUTION INTRAMUSCULAR; INTRAVENOUS at 09:03

## 2018-01-02 RX ADMIN — PHENYTOIN SODIUM SCH MLS/HR: 50 INJECTION INTRAMUSCULAR; INTRAVENOUS at 13:00

## 2018-01-02 NOTE — HHI.PR
Subjective


Remarks


Hand and arm feels much better, less pain, less stiff





Objective





Vital Signs








  Date Time  Temp Pulse Resp B/P (MAP) Pulse Ox O2 Delivery O2 Flow Rate FiO2


 


1/2/18 08:00 98.7 72 19 130/61 (84) 98   


 


1/2/18 04:00 98.8 89 16 108/58 (75) 97   


 


1/2/18 00:51   18     


 


1/2/18 00:00 99.0 98 16 114/59 (77) 97   


 


1/1/18 20:00 96.3 88 16 111/55 (73) 98   


 


1/1/18 16:00 96.0 72 18 105/57 (73) 97   


 


1/1/18 14:30 97.5 63 20 104/55 (71) 100   


 


1/1/18 14:15  66 20 103/59 (74) 100   


 


1/1/18 14:00  72 21 108/58 (75) 100 Nasal Cannula 2 


 


1/1/18 13:51 97.5 83 20 111/61 (78) 100 Nasal Cannula 2 


 


1/1/18 12:07 98.0 70 18 105/50 (68) 96   


 


1/1/18 12:00 96.5 69 16 95/55 (68) 96   


 


1/1/18 10:07 100.3 90 16 119/56 (77) 96   














I/O      


 


 1/1/18 1/1/18 1/1/18 1/2/18 1/2/18 1/2/18





 07:00 15:00 23:00 07:00 15:00 23:00


 


Intake Total  550 ml 1200 ml 1700 ml  


 


Output Total  5 ml    


 


Balance  545 ml 1200 ml 1700 ml  


 


      


 


Intake Oral   1200 ml 1700 ml  


 


IV Total  250 ml    


 


Other  300 ml    


 


Output Estimated Blood Loss  5 ml    


 


# Voids 1  4 0  





AF VS wnl


No acute distress


Left arm dressing removed, packing removed


Wound roughly 3 cm x 1 cm


No purulence appreciated


Erythema/induration significantly improved


Much less tender


Hand range of motion much improved


Result Diagram:  


12/31/17 1855                                                                  

              1/2/18 0200








Assessment and Plan


Problem List:  


(1) Cellulitis of forearm, right


ICD Codes:  L03.113 - Cellulitis of forearm, right


Status:  Acute


(2) Abscess of arm, left


ICD Codes:  L02.414 - Cutaneous abscess of left upper limb


Status:  Acute


Assessment and Plan


32-year-old male postoperative day 1 status post left upper arm abscess 

incision and drainage


Doing well


Abscess cavity tracked snf up patient's arm, please continue on IV 

antibiotics


Please consul OT and PT for gentle upper extremity range of motion and 

ambulation


Gentle packing with iodoform gauze twice a day by nursing











Augusto Zuniga MD Jan 2, 2018 08:59

## 2018-01-02 NOTE — HHI.PR
Subjective


Remarks


Resting in bed


Complain of pain in his arm he wants his Dilaudid 8 mg by mouth on top of what 

he is getting iv


No fever or chills





Objective


Vitals





Vital Signs








  Date Time  Temp Pulse Resp B/P (MAP) Pulse Ox O2 Delivery O2 Flow Rate FiO2


 


1/2/18 16:00 96.6 68 19 110/58 (75) 100   


 


1/2/18 15:06   18     


 


1/2/18 12:31 96.6 76 19 119/53 (75) 98   


 


1/2/18 09:37     97 Nasal Cannula 2.00 


 


1/2/18 08:00 98.7 72 19 130/61 (84) 98   


 


1/2/18 04:00 98.8 89 16 108/58 (75) 97   


 


1/2/18 00:51   18     


 


1/2/18 00:00 99.0 98 16 114/59 (77) 97   


 


1/1/18 20:00 96.3 88 16 111/55 (73) 98   














I/O      


 


 1/1/18 1/1/18 1/1/18 1/2/18 1/2/18 1/2/18





 07:00 15:00 23:00 07:00 15:00 23:00


 


Intake Total  550 ml 1200 ml 1700 ml  100 ml


 


Output Total  5 ml    


 


Balance  545 ml 1200 ml 1700 ml  100 ml


 


      


 


Intake Oral   1200 ml 1700 ml  


 


IV Total  250 ml    100 ml


 


Other  300 ml    


 


Output Estimated Blood Loss  5 ml    


 


# Voids 1  4 0  








Result Diagram:  


1/2/18 0913                                                                    

            1/2/18 0913





Objective Remarks


GENERAL: This is a well-nourished, well-developed patient, in no apparent 

distress.


SKIN: No rashes, warm and dry 


HEAD: Atraumatic. Normocephalic. 


EYES: Pupils equal round and reactive. Extraocular motions intact. No scleral 

icterus. 


ENT: Nose without bleeding, or drainage, Airway patent.


NECK: Trachea midline.  Supple


CARDIOVASCULAR: Regular rate and rhythm without murmurs, gallops, or rubs. 


RESPIRATORY: Fair air entry bilaterally. No wheezes, rales, or rhonchi.  


GASTROINTESTINAL: Abdomen soft, non-tender, nondistended.  Positive bowel sounds


MUSCULOSKELETAL: Extremities without clubbing, cyanosis, or edema.  Pedal 

pulses appreciated, LEFT upper extremity in gauze


NEUROLOGICAL: Awake and alert.  Moves all extremity.  Normal speech.no focal 

neurological deficit





A/P


Problem List:  


(1) Sepsis


ICD Code:  A41.9 - Sepsis, unspecified organism


Status:  Acute


(2) Abscess of arm, left


ICD Code:  L02.414 - Cutaneous abscess of left upper limb


Status:  Acute


(3) HTN (hypertension)


ICD Code:  I10 - Essential (primary) hypertension


(4) IVDU (intravenous drug user)


ICD Code:  F19.90 - Other psychoactive substance use, unspecified, uncomplicated


(5) Tobacco abuse


ICD Code:  Z72.0 - Tobacco use


Status:  Acute


Assessment and Plan


1/2/18: Patient refusing blood work, WBC yesterday 17 K, still pending for today

, I will hold vancomycin and monitor culture.


Wound culture came back as to for non-AB D Streptococcus, will continue cefepime

, follow surgery recommendation





A/P:


32-year-old male with a medical significant for IV drug use who presents with 

sepsis with source left upper extremity





Sepsis


- Leukocytosis, tachycardia, with fever, source is upper [place] abscess


- Patient is s/p Vanco and Zosyn in the ER


- Continued on Vanco and cefepime 1/1/18


- Blood cultures obtained and are pending


- See below





Left upper extremity abscess


-Showed on CT, He denies IV drug abuse although track are present.


-Appreciate general surgery consultation


- he is on chronic opiates, has prescription paper with him, written by Dr. Abraham Uribe in [place], it was also verified during his last hospitalization 

in March: Dilaudid 8 mg q8 hrs, will order dilaudid 6 mg IV q8 will add 

morphine for breakthrough pain





Hypertension


- Controlled





IV drug abuse


- Denies 





Tobacco abuse


- Counseled





DVT prophylaxis: Bilateral SCDs





Problem Qualifiers





(1) Sepsis:  


Qualified Codes:  A41.9 - Sepsis, unspecified organism








Diann Frank MD Jan 2, 2018 16:59

## 2018-01-03 VITALS
TEMPERATURE: 97.8 F | RESPIRATION RATE: 20 BRPM | SYSTOLIC BLOOD PRESSURE: 114 MMHG | HEART RATE: 80 BPM | DIASTOLIC BLOOD PRESSURE: 59 MMHG | OXYGEN SATURATION: 98 %

## 2018-01-03 VITALS
HEART RATE: 59 BPM | SYSTOLIC BLOOD PRESSURE: 112 MMHG | OXYGEN SATURATION: 98 % | DIASTOLIC BLOOD PRESSURE: 64 MMHG | TEMPERATURE: 97.6 F | RESPIRATION RATE: 17 BRPM

## 2018-01-03 VITALS
HEART RATE: 82 BPM | RESPIRATION RATE: 17 BRPM | SYSTOLIC BLOOD PRESSURE: 113 MMHG | OXYGEN SATURATION: 98 % | DIASTOLIC BLOOD PRESSURE: 64 MMHG | TEMPERATURE: 96.3 F

## 2018-01-03 VITALS
SYSTOLIC BLOOD PRESSURE: 127 MMHG | HEART RATE: 82 BPM | DIASTOLIC BLOOD PRESSURE: 63 MMHG | TEMPERATURE: 96.9 F | OXYGEN SATURATION: 95 % | RESPIRATION RATE: 18 BRPM

## 2018-01-03 VITALS
TEMPERATURE: 95.8 F | RESPIRATION RATE: 18 BRPM | SYSTOLIC BLOOD PRESSURE: 118 MMHG | DIASTOLIC BLOOD PRESSURE: 63 MMHG | OXYGEN SATURATION: 96 % | HEART RATE: 72 BPM

## 2018-01-03 VITALS
DIASTOLIC BLOOD PRESSURE: 52 MMHG | SYSTOLIC BLOOD PRESSURE: 99 MMHG | RESPIRATION RATE: 17 BRPM | OXYGEN SATURATION: 96 % | HEART RATE: 64 BPM | TEMPERATURE: 95.8 F

## 2018-01-03 VITALS — OXYGEN SATURATION: 98 %

## 2018-01-03 VITALS — HEART RATE: 79 BPM

## 2018-01-03 LAB
BASOPHILS # BLD AUTO: 0 TH/MM3 (ref 0–0.2)
BASOPHILS NFR BLD: 0.6 % (ref 0–2)
EOSINOPHIL # BLD: 0.3 TH/MM3 (ref 0–0.4)
EOSINOPHIL NFR BLD: 4.4 % (ref 0–4)
ERYTHROCYTE [DISTWIDTH] IN BLOOD BY AUTOMATED COUNT: 14 % (ref 11.6–17.2)
HCT VFR BLD CALC: 37.7 % (ref 39–51)
HGB BLD-MCNC: 13 GM/DL (ref 13–17)
LYMPHOCYTES # BLD AUTO: 2.2 TH/MM3 (ref 1–4.8)
LYMPHOCYTES NFR BLD AUTO: 28.4 % (ref 9–44)
MCH RBC QN AUTO: 30.3 PG (ref 27–34)
MCHC RBC AUTO-ENTMCNC: 34.4 % (ref 32–36)
MCV RBC AUTO: 88 FL (ref 80–100)
MONOCYTE #: 0.6 TH/MM3 (ref 0–0.9)
MONOCYTES NFR BLD: 7.6 % (ref 0–8)
NEUTROPHILS # BLD AUTO: 4.5 TH/MM3 (ref 1.8–7.7)
NEUTROPHILS NFR BLD AUTO: 59 % (ref 16–70)
PLATELET # BLD: 265 TH/MM3 (ref 150–450)
PMV BLD AUTO: 8.2 FL (ref 7–11)
RBC # BLD AUTO: 4.28 MIL/MM3 (ref 4.5–5.9)
WBC # BLD AUTO: 7.7 TH/MM3 (ref 4–11)

## 2018-01-03 RX ADMIN — HYDROMORPHONE HYDROCHLORIDE PRN MG: 4 INJECTION, SOLUTION INTRAMUSCULAR; INTRAVENOUS; SUBCUTANEOUS at 05:40

## 2018-01-03 RX ADMIN — STANDARDIZED SENNA CONCENTRATE AND DOCUSATE SODIUM SCH TAB: 8.6; 5 TABLET, FILM COATED ORAL at 08:42

## 2018-01-03 RX ADMIN — HYDROMORPHONE HYDROCHLORIDE PRN MG: 4 INJECTION, SOLUTION INTRAMUSCULAR; INTRAVENOUS; SUBCUTANEOUS at 22:26

## 2018-01-03 RX ADMIN — HYDROMORPHONE HYDROCHLORIDE PRN MG: 4 INJECTION, SOLUTION INTRAMUSCULAR; INTRAVENOUS; SUBCUTANEOUS at 14:08

## 2018-01-03 RX ADMIN — PHENYTOIN SODIUM SCH MLS/HR: 50 INJECTION INTRAMUSCULAR; INTRAVENOUS at 19:00

## 2018-01-03 RX ADMIN — CEFEPIME SCH MLS/HR: 1 INJECTION, POWDER, FOR SOLUTION INTRAMUSCULAR; INTRAVENOUS at 21:49

## 2018-01-03 RX ADMIN — STANDARDIZED SENNA CONCENTRATE AND DOCUSATE SODIUM SCH TAB: 8.6; 5 TABLET, FILM COATED ORAL at 21:00

## 2018-01-03 RX ADMIN — MORPHINE SULFATE PRN MG: 2 INJECTION, SOLUTION INTRAMUSCULAR; INTRAVENOUS at 17:46

## 2018-01-03 RX ADMIN — PHENYTOIN SODIUM SCH MLS/HR: 50 INJECTION INTRAMUSCULAR; INTRAVENOUS at 08:42

## 2018-01-03 RX ADMIN — Medication SCH ML: at 21:50

## 2018-01-03 RX ADMIN — Medication SCH ML: at 08:42

## 2018-01-03 RX ADMIN — MORPHINE SULFATE PRN MG: 2 INJECTION, SOLUTION INTRAMUSCULAR; INTRAVENOUS at 12:32

## 2018-01-03 RX ADMIN — MORPHINE SULFATE PRN MG: 2 INJECTION, SOLUTION INTRAMUSCULAR; INTRAVENOUS at 23:50

## 2018-01-03 RX ADMIN — CEFEPIME SCH MLS/HR: 1 INJECTION, POWDER, FOR SOLUTION INTRAMUSCULAR; INTRAVENOUS at 08:41

## 2018-01-03 RX ADMIN — MORPHINE SULFATE PRN MG: 2 INJECTION, SOLUTION INTRAMUSCULAR; INTRAVENOUS at 08:41

## 2018-01-04 VITALS
HEART RATE: 66 BPM | OXYGEN SATURATION: 99 % | RESPIRATION RATE: 18 BRPM | SYSTOLIC BLOOD PRESSURE: 110 MMHG | TEMPERATURE: 96.7 F | DIASTOLIC BLOOD PRESSURE: 55 MMHG

## 2018-01-04 VITALS
OXYGEN SATURATION: 95 % | RESPIRATION RATE: 18 BRPM | SYSTOLIC BLOOD PRESSURE: 108 MMHG | TEMPERATURE: 96.6 F | HEART RATE: 61 BPM | DIASTOLIC BLOOD PRESSURE: 58 MMHG

## 2018-01-04 VITALS
SYSTOLIC BLOOD PRESSURE: 122 MMHG | RESPIRATION RATE: 20 BRPM | OXYGEN SATURATION: 96 % | DIASTOLIC BLOOD PRESSURE: 79 MMHG | HEART RATE: 80 BPM | TEMPERATURE: 97.5 F

## 2018-01-04 VITALS
DIASTOLIC BLOOD PRESSURE: 63 MMHG | SYSTOLIC BLOOD PRESSURE: 135 MMHG | RESPIRATION RATE: 20 BRPM | HEART RATE: 84 BPM | OXYGEN SATURATION: 94 % | TEMPERATURE: 97.5 F

## 2018-01-04 VITALS
RESPIRATION RATE: 18 BRPM | HEART RATE: 78 BPM | DIASTOLIC BLOOD PRESSURE: 56 MMHG | TEMPERATURE: 96.5 F | OXYGEN SATURATION: 96 % | SYSTOLIC BLOOD PRESSURE: 98 MMHG

## 2018-01-04 VITALS
SYSTOLIC BLOOD PRESSURE: 105 MMHG | HEART RATE: 85 BPM | DIASTOLIC BLOOD PRESSURE: 55 MMHG | OXYGEN SATURATION: 97 % | RESPIRATION RATE: 18 BRPM | TEMPERATURE: 96.1 F

## 2018-01-04 VITALS — OXYGEN SATURATION: 95 %

## 2018-01-04 VITALS — HEART RATE: 90 BPM

## 2018-01-04 RX ADMIN — MORPHINE SULFATE PRN MG: 2 INJECTION, SOLUTION INTRAMUSCULAR; INTRAVENOUS at 08:08

## 2018-01-04 RX ADMIN — STANDARDIZED SENNA CONCENTRATE AND DOCUSATE SODIUM SCH TAB: 8.6; 5 TABLET, FILM COATED ORAL at 08:08

## 2018-01-04 RX ADMIN — CEFEPIME SCH MLS/HR: 1 INJECTION, POWDER, FOR SOLUTION INTRAMUSCULAR; INTRAVENOUS at 19:58

## 2018-01-04 RX ADMIN — PHENYTOIN SODIUM SCH MLS/HR: 50 INJECTION INTRAMUSCULAR; INTRAVENOUS at 15:00

## 2018-01-04 RX ADMIN — CEFEPIME SCH MLS/HR: 1 INJECTION, POWDER, FOR SOLUTION INTRAMUSCULAR; INTRAVENOUS at 19:43

## 2018-01-04 RX ADMIN — Medication SCH ML: at 19:43

## 2018-01-04 RX ADMIN — CEFEPIME SCH MLS/HR: 1 INJECTION, POWDER, FOR SOLUTION INTRAMUSCULAR; INTRAVENOUS at 08:08

## 2018-01-04 RX ADMIN — STANDARDIZED SENNA CONCENTRATE AND DOCUSATE SODIUM SCH TAB: 8.6; 5 TABLET, FILM COATED ORAL at 19:44

## 2018-01-04 RX ADMIN — Medication SCH ML: at 08:08

## 2018-01-04 RX ADMIN — PHENYTOIN SODIUM SCH MLS/HR: 50 INJECTION INTRAMUSCULAR; INTRAVENOUS at 03:54

## 2018-01-04 RX ADMIN — HYDROMORPHONE HYDROCHLORIDE PRN MG: 4 INJECTION, SOLUTION INTRAMUSCULAR; INTRAVENOUS; SUBCUTANEOUS at 07:00

## 2018-01-04 NOTE — HHI.PR
Subjective


Remarks


Delayed entry from 1/3/18





pt resting in bed , in nad 


cont to ask for inc pain med 


cx is strep non A B D 


will check with sx for clearance for dc


afebrile





Objective


Vitals





Vital Signs








  Date Time  Temp Pulse Resp B/P (MAP) Pulse Ox O2 Delivery O2 Flow Rate FiO2


 


1/4/18 08:13   17     


 


1/4/18 08:00 96.6 61 18 108/58 (75) 95   


 


1/4/18 04:37 96.5 78 18 98/56 (70) 96   


 


1/4/18 00:16 97.5 80 20 122/79 (93) 96   


 


1/3/18 21:56  79      


 


1/3/18 20:00 97.8 80 20 114/59 (77) 98   


 


1/3/18 18:03     98 Nasal Cannula 2.00 


 


1/3/18 16:31 96.3 82 17 113/64 (80) 98   


 


1/3/18 14:47   17     


 


1/3/18 12:00 97.6 59 17 112/64 (80) 98   


 


1/3/18 11:23     98 Nasal Cannula 2.00 














I/O      


 


 1/3/18 1/3/18 1/3/18 1/4/18 1/4/18 1/4/18





 07:00 15:00 23:00 07:00 15:00 23:00


 


Intake Total 2500 ml 1100 ml 820 ml 780 ml  


 


Balance 2500 ml 1100 ml 820 ml 780 ml  


 


      


 


Intake Oral 2500 ml  720 ml 780 ml  


 


IV Total  1100 ml 100 ml   


 


# Voids 2  4 3  


 


# Bowel Movements 1  1 0  








Result Diagram:  


1/3/18 0551                                                                    

            1/2/18 0913





Objective Remarks


GENERAL: This is a well-nourished, well-developed patient, in no apparent 

distress.


SKIN: No rashes, warm and dry 


HEAD: Atraumatic. Normocephalic. 


EYES: Pupils equal round and reactive. Extraocular motions intact. No scleral 

icterus. 


ENT: Nose without bleeding, or drainage, Airway patent.


NECK: Trachea midline.  Supple


CARDIOVASCULAR: Regular rate and rhythm without murmurs, gallops, or rubs. 


RESPIRATORY: Fair air entry bilaterally. No wheezes, rales, or rhonchi.  


GASTROINTESTINAL: Abdomen soft, non-tender, nondistended.  Positive bowel sounds


MUSCULOSKELETAL: Extremities without clubbing, cyanosis, or edema.  Pedal 

pulses appreciated, LEFT upper extremity in gauze


NEUROLOGICAL: Awake and alert.  Moves all extremity.  Normal speech.no focal 

neurological deficit





A/P


Problem List:  


(1) Sepsis


ICD Code:  A41.9 - Sepsis, unspecified organism


Status:  Acute


(2) Abscess of arm, left


ICD Code:  L02.414 - Cutaneous abscess of left upper limb


Status:  Acute


(3) HTN (hypertension)


ICD Code:  I10 - Essential (primary) hypertension


(4) IVDU (intravenous drug user)


ICD Code:  F19.90 - Other psychoactive substance use, unspecified, uncomplicated


(5) Tobacco abuse


ICD Code:  Z72.0 - Tobacco use


Status:  Acute


Assessment and Plan


1/2/18: Patient refusing blood work, WBC yesterday 17 K, still pending for today

, I will hold vancomycin and monitor culture.


Wound culture came back as to for non-AB D Streptococcus, will continue cefepime

, follow surgery recommendation


1/3: cx strep non ABD, can do pcn for 10 days 


sx did not rec dc at this time , we will ff 





A/P:


32-year-old male with a medical significant for IV drug use who presents with 

sepsis with source left upper extremity





Sepsis


- Leukocytosis, tachycardia, with fever, source is upper [place] abscess


- Patient is s/p Vanco and Zosyn in the ER


- Continued on Vanco and cefepime 1/1/18


- Blood cultures obtained and are pending


- See below





Left upper extremity abscess


-Showed on CT, He denies IV drug abuse although track are present.


-Appreciate general surgery consultation


- he is on chronic opiates, has prescription paper with him, written by Dr. Abraham Uribe in [place], it was also verified during his last hospitalization 

in March: Dilaudid 8 mg q8 hrs, will order dilaudid 6 mg IV q8 will add 

morphine for breakthrough pain





Hypertension


- Controlled





IV drug abuse


- Denies 





Tobacco abuse


- Counseled





DVT prophylaxis: Bilateral SCDs





Problem Qualifiers





(1) Sepsis:  


Qualified Codes:  A41.9 - Sepsis, unspecified organism








Diann Frank MD Jan 4, 2018 10:42

## 2018-01-04 NOTE — HHI.PR
Subjective


Remarks


Still complaining of pain as usual, afebrile, surgery did not clear him for 

discharge.





Objective


Vitals





Vital Signs








  Date Time  Temp Pulse Resp B/P (MAP) Pulse Ox O2 Delivery O2 Flow Rate FiO2


 


1/4/18 12:31 96.7 66 18 110/55 (73) 99   


 


1/4/18 08:13   17     


 


1/4/18 08:00 96.6 61 18 108/58 (75) 95   


 


1/4/18 04:37 96.5 78 18 98/56 (70) 96   


 


1/4/18 00:16 97.5 80 20 122/79 (93) 96   


 


1/3/18 21:56  79      


 


1/3/18 20:00 97.8 80 20 114/59 (77) 98   


 


1/3/18 18:03     98 Nasal Cannula 2.00 


 


1/3/18 16:31 96.3 82 17 113/64 (80) 98   


 


1/3/18 14:47   17     














I/O      


 


 1/3/18 1/3/18 1/3/18 1/4/18 1/4/18 1/4/18





 07:00 15:00 23:00 07:00 15:00 23:00


 


Intake Total 2500 ml 1100 ml 820 ml 780 ml  


 


Balance 2500 ml 1100 ml 820 ml 780 ml  


 


      


 


Intake Oral 2500 ml  720 ml 780 ml  


 


IV Total  1100 ml 100 ml   


 


# Voids 2  4 3  


 


# Bowel Movements 1  1 0  








Result Diagram:  


1/3/18 0551                                                                    

            1/2/18 0913





Objective Remarks


GENERAL: This is a well-nourished, well-developed patient, in no apparent 

distress.


SKIN: No rashes, warm and dry 


HEAD: Atraumatic. Normocephalic. 


EYES: Pupils equal round and reactive. Extraocular motions intact. No scleral 

icterus. 


ENT: Nose without bleeding, or drainage, Airway patent.


NECK: Trachea midline.  Supple


CARDIOVASCULAR: Regular rate and rhythm without murmurs, gallops, or rubs. 


RESPIRATORY: Fair air entry bilaterally. No wheezes, rales, or rhonchi.  


GASTROINTESTINAL: Abdomen soft, non-tender, nondistended.  Positive bowel sounds


MUSCULOSKELETAL: Extremities without clubbing, cyanosis, or edema.  Pedal 

pulses appreciated, LEFT upper extremity in gauze


NEUROLOGICAL: Awake and alert.  Moves all extremity.  Normal speech.no focal 

neurological deficit





A/P


Problem List:  


(1) Sepsis


ICD Code:  A41.9 - Sepsis, unspecified organism


Status:  Acute


(2) Abscess of arm, left


ICD Code:  L02.414 - Cutaneous abscess of left upper limb


Status:  Acute


(3) HTN (hypertension)


ICD Code:  I10 - Essential (primary) hypertension


(4) IVDU (intravenous drug user)


ICD Code:  F19.90 - Other psychoactive substance use, unspecified, uncomplicated


(5) Tobacco abuse


ICD Code:  Z72.0 - Tobacco use


Status:  Acute


Assessment and Plan


1/2/18: Patient refusing blood work, WBC yesterday 17 K, still pending for today

, I will hold vancomycin and monitor culture.


Wound culture came back as to for non-AB D Streptococcus, will continue cefepime

, follow surgery recommendation


1/3: cx strep non ABD, can do pcn for 10 days 


sx did not rec dc at this time , we will ff 


1/4: Continue iv antibiotic while in-house, surgery did not clear for discharge


A/P:


32-year-old male with a medical significant for IV drug use who presents with 

sepsis with source left upper extremity





Sepsis


- Leukocytosis, tachycardia, with fever, source is upper [place]um[place] abscess


- Patient is s/p Vanco and Zosyn in the ER


- Continued on Vanco and cefepime 1/1/18


- Blood cultures obtained and are pending


- See below





Left upper extremity abscess


-Showed on CT, He denies IV drug abuse although track are present.


-Appreciate general surgery consultation


- he is on chronic opiates, has prescription paper with him, written by Dr. Abraham Uribe in [place], it was also verified during his last hospitalization 

in March: Dilaudid 8 mg q8 hrs, will order dilaudid 6 mg IV q8 will add 

morphine for breakthrough pain





Hypertension


- Controlled





IV drug abuse


- Denies 





Tobacco abuse


- Counseled





DVT prophylaxis: Bilateral SCDs


Discharge Planning


Awaiting surgical clearance





Problem Qualifiers





(1) Sepsis:  


Qualified Codes:  A41.9 - Sepsis, unspecified organism








Diann Frank MD Jan 4, 2018 13:43

## 2018-01-04 NOTE — HHI.PR
Subjective


Remarks


Hand and arm feels much better, less pain, less stiff





Objective





Vital Signs








  Date Time  Temp Pulse Resp B/P (MAP) Pulse Ox O2 Delivery O2 Flow Rate FiO2


 


1/4/18 08:13   17     


 


1/4/18 08:00 96.6 61 18 108/58 (75) 95   


 


1/4/18 04:37 96.5 78 18 98/56 (70) 96   


 


1/4/18 00:16 97.5 80 20 122/79 (93) 96   


 


1/3/18 21:56  79      


 


1/3/18 20:00 97.8 80 20 114/59 (77) 98   


 


1/3/18 18:03     98 Nasal Cannula 2.00 


 


1/3/18 16:31 96.3 82 17 113/64 (80) 98   


 


1/3/18 14:47   17     


 


1/3/18 12:00 97.6 59 17 112/64 (80) 98   


 


1/3/18 11:23     98 Nasal Cannula 2.00 














I/O      


 


 1/3/18 1/3/18 1/3/18 1/4/18 1/4/18 1/4/18





 07:00 15:00 23:00 07:00 15:00 23:00


 


Intake Total 2500 ml 1100 ml 820 ml 780 ml  


 


Balance 2500 ml 1100 ml 820 ml 780 ml  


 


      


 


Intake Oral 2500 ml  720 ml 780 ml  


 


IV Total  1100 ml 100 ml   


 


# Voids 2  4 3  


 


# Bowel Movements 1  1 0  





Dressing/packing removed


Much less erythematous


No purulence


Result Diagram:  


1/3/18 0551                                                                    

            1/2/18 0913








Assessment and Plan


Problem List:  


(1) Cellulitis of forearm, right


ICD Codes:  L03.113 - Cellulitis of forearm, right


Status:  Acute


(2) Abscess of arm, left


ICD Codes:  L02.414 - Cutaneous abscess of left upper limb


Status:  Acute


Assessment and Plan


32-year-old male status post left upper arm abscess incision and drainage


Doing well


Wound gapping closed down with steri strips, leaving central 1cm for packing w/ 

iodoform wick bid by nursing


Consider changing IV dilaudid to PO per primary











JustinsaAugusto wood MD Jan 4, 2018 08:58

## 2018-01-05 VITALS
DIASTOLIC BLOOD PRESSURE: 57 MMHG | RESPIRATION RATE: 16 BRPM | HEART RATE: 67 BPM | TEMPERATURE: 97.6 F | OXYGEN SATURATION: 98 % | SYSTOLIC BLOOD PRESSURE: 126 MMHG

## 2018-01-05 VITALS
SYSTOLIC BLOOD PRESSURE: 102 MMHG | DIASTOLIC BLOOD PRESSURE: 54 MMHG | TEMPERATURE: 97.7 F | HEART RATE: 74 BPM | OXYGEN SATURATION: 96 % | RESPIRATION RATE: 20 BRPM

## 2018-01-05 VITALS
RESPIRATION RATE: 20 BRPM | DIASTOLIC BLOOD PRESSURE: 65 MMHG | TEMPERATURE: 98.3 F | HEART RATE: 84 BPM | SYSTOLIC BLOOD PRESSURE: 124 MMHG | OXYGEN SATURATION: 98 %

## 2018-01-05 VITALS
SYSTOLIC BLOOD PRESSURE: 123 MMHG | RESPIRATION RATE: 16 BRPM | OXYGEN SATURATION: 97 % | TEMPERATURE: 98.3 F | HEART RATE: 75 BPM | DIASTOLIC BLOOD PRESSURE: 57 MMHG

## 2018-01-05 VITALS — OXYGEN SATURATION: 95 %

## 2018-01-05 VITALS
SYSTOLIC BLOOD PRESSURE: 128 MMHG | RESPIRATION RATE: 17 BRPM | OXYGEN SATURATION: 98 % | HEART RATE: 60 BPM | DIASTOLIC BLOOD PRESSURE: 70 MMHG | TEMPERATURE: 97.1 F

## 2018-01-05 VITALS — HEART RATE: 54 BPM

## 2018-01-05 VITALS — OXYGEN SATURATION: 98 %

## 2018-01-05 RX ADMIN — Medication SCH ML: at 09:00

## 2018-01-05 RX ADMIN — STANDARDIZED SENNA CONCENTRATE AND DOCUSATE SODIUM SCH TAB: 8.6; 5 TABLET, FILM COATED ORAL at 07:46

## 2018-01-05 RX ADMIN — CEFEPIME SCH MLS/HR: 1 INJECTION, POWDER, FOR SOLUTION INTRAMUSCULAR; INTRAVENOUS at 21:10

## 2018-01-05 RX ADMIN — CEFEPIME SCH MLS/HR: 1 INJECTION, POWDER, FOR SOLUTION INTRAMUSCULAR; INTRAVENOUS at 09:00

## 2018-01-05 RX ADMIN — Medication SCH ML: at 20:04

## 2018-01-05 RX ADMIN — PHENYTOIN SODIUM SCH MLS/HR: 50 INJECTION INTRAMUSCULAR; INTRAVENOUS at 01:00

## 2018-01-05 RX ADMIN — PHENYTOIN SODIUM SCH MLS/HR: 50 INJECTION INTRAMUSCULAR; INTRAVENOUS at 11:00

## 2018-01-05 RX ADMIN — STANDARDIZED SENNA CONCENTRATE AND DOCUSATE SODIUM SCH TAB: 8.6; 5 TABLET, FILM COATED ORAL at 21:11

## 2018-01-05 NOTE — HHI.PR
Subjective


Remarks


Resting in bed, afebrile


No acute issues overnight





Objective


Vitals





Vital Signs








  Date Time  Temp Pulse Resp B/P (MAP) Pulse Ox O2 Delivery O2 Flow Rate FiO2


 


1/5/18 17:31     98   21


 


1/5/18 16:00 98.3 75 16 123/57 (79) 97   


 


1/5/18 13:37   20     


 


1/5/18 12:00 97.6 67 16 126/57 (80) 98   


 


1/5/18 11:15     95   


 


1/5/18 08:00 97.1 66 17 128/70 (89) 98   


 


1/5/18 08:00  60      


 


1/5/18 03:47  54      


 


1/5/18 00:00 98.3 84 20 124/65 (84) 98   


 


1/4/18 23:47  90      


 


1/4/18 22:11     95 Nasal Cannula 2.00 


 


1/4/18 20:00 97.5 84 20 135/63 (87) 94   














I/O      


 


 1/4/18 1/4/18 1/4/18 1/5/18 1/5/18 1/5/18





 07:00 15:00 23:00 07:00 15:00 23:00


 


Intake Total 780 ml 100 ml 960 ml   900 ml


 


Balance 780 ml 100 ml 960 ml   900 ml


 


      


 


Intake Oral 780 ml  960 ml   900 ml


 


IV Total  100 ml    


 


# Voids 3  3   3


 


# Bowel Movements 0  1   1








Result Diagram:  


1/3/18 0551                                                                    

            1/2/18 0913





Objective Remarks


GENERAL: This is a well-nourished, well-developed patient, in no apparent 

distress.


SKIN: No rashes, warm and dry 


HEAD: Atraumatic. Normocephalic. 


EYES: Pupils equal round and reactive. Extraocular motions intact. No scleral 

icterus. 


ENT: Nose without bleeding, or drainage, Airway patent.


NECK: Trachea midline.  Supple


CARDIOVASCULAR: Regular rate and rhythm without murmurs, gallops, or rubs. 


RESPIRATORY: Fair air entry bilaterally. No wheezes, rales, or rhonchi.  


GASTROINTESTINAL: Abdomen soft, non-tender, nondistended.  Positive bowel sounds


MUSCULOSKELETAL: Extremities without clubbing, cyanosis, or edema.  Pedal 

pulses appreciated, LEFT upper extremity in gauze


NEUROLOGICAL: Awake and alert.  Moves all extremity.  Normal speech.no focal 

neurological deficit





A/P


Problem List:  


(1) Sepsis


ICD Code:  A41.9 - Sepsis, unspecified organism


Status:  Acute


(2) Abscess of arm, left


ICD Code:  L02.414 - Cutaneous abscess of left upper limb


Status:  Acute


(3) HTN (hypertension)


ICD Code:  I10 - Essential (primary) hypertension


(4) IVDU (intravenous drug user)


ICD Code:  F19.90 - Other psychoactive substance use, unspecified, uncomplicated


(5) Tobacco abuse


ICD Code:  Z72.0 - Tobacco use


Status:  Acute


Assessment and Plan


1/5: Tinea current care with iv antibiotic, dressing changes per surgery, home 

once cleared by surgery on 10 days of poantibiotic





A/P:


32-year-old male with a medical significant for IV drug use who presents with 

sepsis with source left upper extremity





Sepsis


- Leukocytosis, tachycardia, with fever, source is upper [place] abscess


- Patient is s/p Vanco and Zosyn in the ER


- Continued on Vanco and cefepime 1/1/18


- Blood cultures obtained and are pending


- See below





Left upper extremity abscess


-Showed on CT, He denies IV drug abuse although track are present.


-Appreciate general surgery consultation


- he is on chronic opiates, has prescription paper with him, written by Dr. Abraham Uribe in [place], it was also verified during his last hospitalization 

in March: Dilaudid 8 mg q8 hrs, will order dilaudid 6 mg IV q8 will add 

morphine for breakthrough pain





Hypertension


- Controlled





IV drug abuse


- Denies 





Tobacco abuse


- Counseled





DVT prophylaxis: Bilateral SCDs





Problem Qualifiers





(1) Sepsis:  


Qualified Codes:  A41.9 - Sepsis, unspecified organism








Diann Frank MD Jan 5, 2018 19:40

## 2018-01-06 VITALS
OXYGEN SATURATION: 98 % | TEMPERATURE: 97.1 F | HEART RATE: 61 BPM | SYSTOLIC BLOOD PRESSURE: 111 MMHG | DIASTOLIC BLOOD PRESSURE: 54 MMHG

## 2018-01-06 VITALS
DIASTOLIC BLOOD PRESSURE: 69 MMHG | RESPIRATION RATE: 20 BRPM | TEMPERATURE: 97.6 F | HEART RATE: 78 BPM | OXYGEN SATURATION: 97 % | SYSTOLIC BLOOD PRESSURE: 111 MMHG

## 2018-01-06 VITALS
HEART RATE: 77 BPM | DIASTOLIC BLOOD PRESSURE: 54 MMHG | RESPIRATION RATE: 16 BRPM | TEMPERATURE: 97.4 F | OXYGEN SATURATION: 94 % | SYSTOLIC BLOOD PRESSURE: 109 MMHG

## 2018-01-06 VITALS
HEART RATE: 54 BPM | RESPIRATION RATE: 17 BRPM | DIASTOLIC BLOOD PRESSURE: 48 MMHG | SYSTOLIC BLOOD PRESSURE: 93 MMHG | OXYGEN SATURATION: 98 % | TEMPERATURE: 97 F

## 2018-01-06 VITALS
DIASTOLIC BLOOD PRESSURE: 56 MMHG | OXYGEN SATURATION: 97 % | TEMPERATURE: 97.4 F | RESPIRATION RATE: 19 BRPM | HEART RATE: 77 BPM | SYSTOLIC BLOOD PRESSURE: 104 MMHG

## 2018-01-06 VITALS
DIASTOLIC BLOOD PRESSURE: 55 MMHG | OXYGEN SATURATION: 97 % | TEMPERATURE: 98.4 F | HEART RATE: 67 BPM | RESPIRATION RATE: 20 BRPM | SYSTOLIC BLOOD PRESSURE: 100 MMHG

## 2018-01-06 VITALS — HEART RATE: 83 BPM

## 2018-01-06 VITALS — OXYGEN SATURATION: 94 %

## 2018-01-06 RX ADMIN — STANDARDIZED SENNA CONCENTRATE AND DOCUSATE SODIUM SCH TAB: 8.6; 5 TABLET, FILM COATED ORAL at 09:00

## 2018-01-06 RX ADMIN — Medication SCH ML: at 21:58

## 2018-01-06 RX ADMIN — Medication SCH ML: at 09:00

## 2018-01-06 RX ADMIN — PHENYTOIN SODIUM SCH MLS/HR: 50 INJECTION INTRAMUSCULAR; INTRAVENOUS at 07:00

## 2018-01-06 RX ADMIN — CEFEPIME SCH MLS/HR: 1 INJECTION, POWDER, FOR SOLUTION INTRAMUSCULAR; INTRAVENOUS at 21:58

## 2018-01-06 RX ADMIN — PHENYTOIN SODIUM SCH MLS/HR: 50 INJECTION INTRAMUSCULAR; INTRAVENOUS at 17:00

## 2018-01-06 RX ADMIN — PHENYTOIN SODIUM SCH MLS/HR: 50 INJECTION INTRAMUSCULAR; INTRAVENOUS at 03:28

## 2018-01-06 RX ADMIN — STANDARDIZED SENNA CONCENTRATE AND DOCUSATE SODIUM SCH TAB: 8.6; 5 TABLET, FILM COATED ORAL at 21:57

## 2018-01-06 RX ADMIN — CEFEPIME SCH MLS/HR: 1 INJECTION, POWDER, FOR SOLUTION INTRAMUSCULAR; INTRAVENOUS at 09:19

## 2018-01-06 NOTE — HHI.PR
Subjective


Remarks


Resting comfortably in bed


No event overnight


Denied chest and or short of breath


No fever or chills


As usual asked him to increase his pain medication





Objective


Vitals





Vital Signs








  Date Time  Temp Pulse Resp B/P (MAP) Pulse Ox O2 Delivery O2 Flow Rate FiO2


 


1/6/18 20:30 97.6 78 20 111/69 (83) 97   


 


1/6/18 17:29     94   21


 


1/6/18 16:00 97.4 77 16 109/54 (72) 94   


 


1/6/18 12:00 97.4 77 19 104/56 (72) 97   


 


1/6/18 08:00 97.0 54 17 93/48 (63) 98   


 


1/6/18 03:25 97.1 61  111/54 (73) 98   


 


1/6/18 00:00 98.4 67 20 100/55 (70) 97   














I/O      


 


 1/5/18 1/5/18 1/5/18 1/6/18 1/6/18 1/6/18





 06:59 14:59 22:59 06:59 14:59 22:59


 


Intake Total   1000 ml 1240 ml  775 ml


 


Balance   1000 ml 1240 ml  775 ml


 


      


 


Intake Oral   900 ml 240 ml  775 ml


 


IV Total   100 ml 1000 ml  


 


# Voids   3   3


 


# Bowel Movements   1   0








Result Diagram:  


1/3/18 0551                                                                    

            1/2/18 0913





Objective Remarks


GENERAL: This is a well-nourished, well-developed patient, in no apparent 

distress.


SKIN: No rashes, warm and dry 


HEAD: Atraumatic. Normocephalic. 


EYES: Pupils equal round and reactive. Extraocular motions intact. No scleral 

icterus. 


ENT: Nose without bleeding, or drainage, Airway patent.


NECK: Trachea midline.  Supple


CARDIOVASCULAR: Regular rate and rhythm without murmurs, gallops, or rubs. 


RESPIRATORY: Fair air entry bilaterally. No wheezes, rales, or rhonchi.  


GASTROINTESTINAL: Abdomen soft, non-tender, nondistended.  Positive bowel sounds


MUSCULOSKELETAL: Extremities without clubbing, cyanosis, or edema.  Pedal 

pulses appreciated, LEFT upper extremity in gauze


NEUROLOGICAL: Awake and alert.  Moves all extremity.  Normal speech.no focal 

neurological deficit





A/P


Problem List:  


(1) Sepsis


ICD Code:  A41.9 - Sepsis, unspecified organism


Status:  Acute


(2) Abscess of arm, left


ICD Code:  L02.414 - Cutaneous abscess of left upper limb


Status:  Acute


(3) HTN (hypertension)


ICD Code:  I10 - Essential (primary) hypertension


(4) IVDU (intravenous drug user)


ICD Code:  F19.90 - Other psychoactive substance use, unspecified, uncomplicated


(5) Tobacco abuse


ICD Code:  Z72.0 - Tobacco use


Status:  Acute


Assessment and Plan


1/6: Continue iv antibiotic awaiting surgical clearance


A/P:


32-year-old male with a medical significant for IV drug use who presents with 

sepsis with source left upper extremity





Sepsis


- Leukocytosis, tachycardia, with fever, source is upper [place] abscess


- Patient is s/p Vanco and Zosyn in the ER


Continue cefepime, follow culture, wound showed strep non-A BD


- See below





Left upper extremity abscess


-Showed on CT, He denies IV drug abuse although track are present.


-Appreciate general surgery consultation


- he is on chronic opiates, has prescription paper with him, written by Dr. Abraham Uribe in [place], it was also verified during his last hospitalization 

in March: Dilaudid 8 mg q8 hrs, will order dilaudid 6 mg IV q8 will add 

morphine for breakthrough pain


-Status post drainage and debridement by surgery, continue iv antibiotic


Hypertension


- Controlled





IV drug abuse


- Denies 





Tobacco abuse


- Counseled





DVT prophylaxis: Bilateral SCDs


Discharge Planning


Awaiting surgical clearance





Problem Qualifiers





(1) Sepsis:  


Qualified Codes:  A41.9 - Sepsis, unspecified organism








Diann Frank MD Jan 6, 2018 21:18

## 2018-01-07 VITALS
OXYGEN SATURATION: 96 % | SYSTOLIC BLOOD PRESSURE: 96 MMHG | HEART RATE: 79 BPM | TEMPERATURE: 97.7 F | RESPIRATION RATE: 18 BRPM | DIASTOLIC BLOOD PRESSURE: 50 MMHG

## 2018-01-07 VITALS
DIASTOLIC BLOOD PRESSURE: 59 MMHG | RESPIRATION RATE: 21 BRPM | OXYGEN SATURATION: 98 % | SYSTOLIC BLOOD PRESSURE: 114 MMHG | HEART RATE: 66 BPM | TEMPERATURE: 97.1 F

## 2018-01-07 VITALS — HEART RATE: 81 BPM

## 2018-01-07 RX ADMIN — Medication SCH ML: at 08:29

## 2018-01-07 RX ADMIN — STANDARDIZED SENNA CONCENTRATE AND DOCUSATE SODIUM SCH TAB: 8.6; 5 TABLET, FILM COATED ORAL at 08:28

## 2018-01-07 RX ADMIN — CEFEPIME SCH MLS/HR: 1 INJECTION, POWDER, FOR SOLUTION INTRAMUSCULAR; INTRAVENOUS at 08:29

## 2018-01-07 RX ADMIN — PHENYTOIN SODIUM SCH MLS/HR: 50 INJECTION INTRAMUSCULAR; INTRAVENOUS at 03:00

## 2018-01-07 NOTE — HHI.DS
__________________________________________________





Discharge Summary


Admission Date


Dec 31, 2017 at 20:27


Discharge Date:  Jan 7, 2018


Admitting Diagnosis





SEPSIS/Left Arm Abscess





(1) Sepsis


ICD Code:  A41.9 - Sepsis, unspecified organism


Status:  Acute


(2) Abscess of arm, left


ICD Code:  L02.414 - Cutaneous abscess of left upper limb


Status:  Acute


(3) HTN (hypertension)


ICD Code:  I10 - Essential (primary) hypertension


(4) IVDU (intravenous drug user)


ICD Code:  F19.90 - Other psychoactive substance use, unspecified, uncomplicated


(5) Tobacco abuse


ICD Code:  Z72.0 - Tobacco use


Status:  Acute


Procedures


See below


Brief History - From Admission


This is a 32-year-old male with a PMH of Tobacco Abuse and h/o IVDU who 

presented to the ER w/ complaints of left arm pain and swelling.  States 

symptoms started approx 3 days ago.  Reports progressive swelling/pain to left 

arm, now involving elbow and forearm.  Pain is constant, severe, 10/10, 

radiates down left arm.  No alleviating factors, worse w/ movement.  Denies IVDU

, however on exam pt w/ +track marks.  On arrival, /81, , O2 sat 98

% on RA, Temp 99.3.  W WBC 17.2.  Chemistry essentially unremarkable.  Lactic 

Acid 1.3.  INR 1.1.  CXR with no acute findings.  CT LUE with significant 

inflammatory changes, loculated fluid collection highly suspicious for abscess.

  S/p Blood Cultures, Vanc/Zosyn in ER.  Ortho consulted for further evaluation.


CBC/BMP:  


1/3/18 0551





PE at Discharge


GENERAL: This is a well-nourished, well-developed patient, in no apparent 

distress.


SKIN: No rashes, warm and dry 


HEAD: Atraumatic. Normocephalic. 


EYES: Pupils equal round and reactive. Extraocular motions intact. No scleral 

icterus. 


ENT: Nose without bleeding, or drainage, Airway patent.


NECK: Trachea midline.  Supple


CARDIOVASCULAR: Regular rate and rhythm without murmurs, gallops, or rubs. 


RESPIRATORY: Fair air entry bilaterally. No wheezes, rales, or rhonchi.  


GASTROINTESTINAL: Abdomen soft, non-tender, nondistended.  Positive bowel sounds


MUSCULOSKELETAL: Extremities without clubbing, cyanosis, or edema.  Pedal 

pulses appreciated, LEFT upper extremity in gauze


NEUROLOGICAL: Awake and alert.  Moves all extremity.  Normal speech.no focal 

neurological deficit


Hospital Course


Patient admitted with sepsis leukocytosis tachycardia and fever was found to 

have left upper extremity abscess, surgery consulted patient had I&D debridement

, started on antibiotic iv, culture showed strep non-A BD, patient hasn't been 

cleared to be discharged by the surgeon for further monitoring for the wound 

however on 1/7/18 patient left the room without notice of the staff as an 

AGAINST MEDICAL ADVICE


Pt Condition on Discharge:  Stable (patient left the hospital on his own AMA)


Discharge Disposition:  Discharge Home


Discharge Time:  <= 30 minutes


Discharge Instructions


DIET: Follow Instructions for:  Heart Healthy Diet


Activities you can perform:  Weight Bearing as Heladio


Additional Information


She left AMA he was not discharged officially











Diann Frank MD Jan 7, 2018 17:56

## 2018-01-07 NOTE — HHI.PR
Subjective


Remarks


Patient requested to keep his Dilaudid 8 mg by mouth scheduled, I explained to 

him that we need to start repairing and go for 6 mg as needed, patient did not 

like that later on patient left his room and the floor without notifying staff 

as AMA





Objective


Vitals





Vital Signs








  Date Time  Temp Pulse Resp B/P (MAP) Pulse Ox O2 Delivery O2 Flow Rate FiO2


 


1/7/18 08:00 97.1 66 21 114/59 (77) 98   


 


1/7/18 00:21 97.7 79 18 96/50 (65) 96   


 


1/7/18 00:00  81      


 


1/6/18 20:30 97.6 78 20 111/69 (83) 97   


 


1/6/18 19:59  83      














I/O      


 


 1/6/18 1/6/18 1/6/18 1/7/18 1/7/18 1/7/18





 07:00 15:00 23:00 07:00 15:00 23:00


 


Intake Total 1240 ml  875 ml 780 ml 100 ml 


 


Balance 1240 ml  875 ml 780 ml 100 ml 


 


      


 


Intake Oral 240 ml  775 ml 780 ml  


 


IV Total 1000 ml  100 ml  100 ml 


 


# Voids   3 4  


 


# Bowel Movements   0   








Result Diagram:  


1/3/18 0551





Objective Remarks


GENERAL: This is a well-nourished, well-developed patient, in no apparent 

distress.


SKIN: No rashes, warm and dry 


HEAD: Atraumatic. Normocephalic. 


EYES: Pupils equal round and reactive. Extraocular motions intact. No scleral 

icterus. 


ENT: Nose without bleeding, or drainage, Airway patent.


NECK: Trachea midline.  Supple


CARDIOVASCULAR: Regular rate and rhythm without murmurs, gallops, or rubs. 


RESPIRATORY: Fair air entry bilaterally. No wheezes, rales, or rhonchi.  


GASTROINTESTINAL: Abdomen soft, non-tender, nondistended.  Positive bowel sounds


MUSCULOSKELETAL: Extremities without clubbing, cyanosis, or edema.  Pedal 

pulses appreciated, LEFT upper extremity in gauze


NEUROLOGICAL: Awake and alert.  Moves all extremity.  Normal speech.no focal 

neurological deficit


Procedures


See below





A/P


Problem List:  


(1) Sepsis


ICD Code:  A41.9 - Sepsis, unspecified organism


Status:  Acute


(2) Abscess of arm, left


ICD Code:  L02.414 - Cutaneous abscess of left upper limb


Status:  Acute


(3) HTN (hypertension)


ICD Code:  I10 - Essential (primary) hypertension


(4) IVDU (intravenous drug user)


ICD Code:  F19.90 - Other psychoactive substance use, unspecified, uncomplicated


(5) Tobacco abuse


ICD Code:  Z72.0 - Tobacco use


Status:  Acute


Assessment and Plan


1/7/18: I started decrease his Dilaudid to 6 mg every 8 hours as needed 

consider scheduled, patient did not like that and he left the room as an AMA


A/P:


32-year-old male with a medical significant for IV drug use who presents with 

sepsis with source left upper extremity





Sepsis


- Leukocytosis, tachycardia, with fever, source is upper [place] abscess


- Patient is s/p Vanco and Zosyn in the ER


Continue cefepime, follow culture, wound showed strep non-A BD


- See below





Left upper extremity abscess


-Showed on CT, He denies IV drug abuse although track are present.


-Appreciate general surgery consultation


- he is on chronic opiates, has prescription paper with him, written by Dr. Abraham Uribe in [place], it was also verified during his last hospitalization 

in March: Dilaudid 8 mg q8 hrs, will order dilaudid 6 mg IV q8 will add 

morphine for breakthrough pain


-Status post drainage and debridement by surgery, continue iv antibiotic


Hypertension


- Controlled





IV drug abuse


- Denies 





Tobacco abuse


- Counseled





DVT prophylaxis: Bilateral SCDs


Discharge Planning


Awaiting surgical clearance





Problem Qualifiers





(1) Sepsis:  


Qualified Codes:  A41.9 - Sepsis, unspecified organism








Diann Frank MD Jan 7, 2018 18:04

## 2018-02-07 ENCOUNTER — HOSPITAL ENCOUNTER (EMERGENCY)
Dept: HOSPITAL 17 - NEPD | Age: 33
Discharge: HOME | End: 2018-02-07
Payer: SELF-PAY

## 2018-02-07 VITALS
HEART RATE: 97 BPM | RESPIRATION RATE: 20 BRPM | DIASTOLIC BLOOD PRESSURE: 85 MMHG | OXYGEN SATURATION: 98 % | SYSTOLIC BLOOD PRESSURE: 123 MMHG

## 2018-02-07 VITALS
DIASTOLIC BLOOD PRESSURE: 85 MMHG | SYSTOLIC BLOOD PRESSURE: 123 MMHG | OXYGEN SATURATION: 99 % | HEART RATE: 101 BPM | TEMPERATURE: 98 F | RESPIRATION RATE: 19 BRPM

## 2018-02-07 VITALS — HEIGHT: 69 IN | BODY MASS INDEX: 22.2 KG/M2 | WEIGHT: 149.91 LBS

## 2018-02-07 DIAGNOSIS — M25.521: Primary | ICD-10-CM

## 2018-02-07 PROCEDURE — 73080 X-RAY EXAM OF ELBOW: CPT

## 2018-02-07 PROCEDURE — 99283 EMERGENCY DEPT VISIT LOW MDM: CPT

## 2018-02-07 NOTE — PD
HPI


Chief Complaint:  Medical Clearance


Time Seen by Provider:  15:54


Travel History


International Travel<30 days:  No


Contact w/Intl Traveler<30days:  No


Traveled to known affect area:  No





History of Present Illness


HPI


the patient is a 32-year-old  male who presents to the emergency 

department in police custody for right elbow pain.  The patient states he was 

getting arrested earlier today when his elbow twisted he subsequently fell 

backwards landing on the elbow.  The  states he spoke with the 

patient's father who states the patient has a history of multiple surgeries 

including reattachment of the right form to the right elbow with chronic 

deformity.  The patient does complain of pain at the right elbow, worse with 

movement.  He denies any numbness or tingling of the right upper extremity, but 

does note Limited range of motion secondary to pain.  Symptoms are moderate, 

there are no current alleviating factors.  Possibly exacerbated by trauma to 

the right elbow.





PFSH


Past Medical History


Hx Anticoagulant Therapy:  No


Arthritis:  Yes (R elbow and back)


Anxiety:  Yes


Depression:  Yes


Heart Rhythm Problems:  Yes ("born with heart murmur" per pt. verbalization)


Cancer:  No


Cardiovascular Problems:  Yes


High Cholesterol:  No


Chemotherapy:  No


Chest Pain:  No


Congestive Heart Failure:  No


Cerebrovascular Accident:  No


Diabetes:  Yes


Patient Takes Glucophage:  No


Diminished Hearing:  No


Endocrine:  No


Gastrointestinal Disorders:  No


Genitourinary:  No


Headaches:  No


Hypertension:  Yes


Immune Disorder:  No


Musculoskeletal:  Yes


Neurologic:  Yes


Psychiatric:  Yes


Reproductive:  No


Respiratory:  No


Immunizations Current:  No


Migraines:  No


Radiation Therapy:  No


Seizures:  No


Tetanus Vaccination:  < 5 Years


Influenza Vaccination:  Yes





Past Surgical History


Abdominal Surgery:  No


Cardiac Surgery:  No


Ear Surgery:  No


Endocrine Surgery:  No


Eye Surgery:  No


Genitourinary Surgery:  No


Gynecologic Surgery:  No


Hysterectomy:  No


Neurologic Surgery:  No


Oral Surgery:  No


Pacemaker:  No


Thoracic Surgery:  No


Other Surgery:  Yes (right arm )





Social History


Alcohol Use:  No


Tobacco Use:  Yes (1/2 ppd)


Substance Use:  Yes (COKE)





Allergies-Medications


(Allergen,Severity, Reaction):  


Coded Allergies:  


     No Known Allergies (Verified  Allergy, Unknown, 2/7/18)


Reported Meds & Prescriptions





Reported Meds & Active Scripts


Active


Penicillin V Potassium 500 Mg Tab 500 Mg PO Q6H 10 Days


Reported


Methocarbamol 750 Mg Tab 750 Mg PO BID


Hydromorphone (Hydromorphone HCl) 8 Mg Tab 8 Mg PO TID PRN








Review of Systems


Except as stated in HPI:  all other systems reviewed are Neg


General / Constitutional:  No: Fever


HENT:  No: Headaches, Neck Pain


Cardiovascular:  No: Chest Pain or Discomfort


Respiratory:  No: Shortness of Breath


Gastrointestinal:  No: Nausea, Vomiting, Abdominal Pain


Musculoskeletal:  Positive: Edema, Pain


Neurologic:  No: Paresthesia, Sensory Disturbance





Physical Exam


Narrative


GENERAL: Awake, alert, nontoxic-appearing 32-year-old male appears his stated 

age is in no acute respiratory distress.  Somewhat disheveled appearance.


SKIN: Focused skin assessment warm/dry.


HEAD: Atraumatic. Normocephalic. 


EYES: Pupils equal and round. No scleral icterus. No injection or drainage. 


NECK: Trachea midline. No JVD. 


CARDIOVASCULAR: Regular rate and rhythm.  No murmur appreciated.


RESPIRATORY: No accessory muscle use. Clear to auscultation. Breath sounds 

equal bilaterally. 


MUSCULOSKELETAL: Pain with old-appearing deformity of the right elbow.  Scar 

noted over the medial aspect of the right elbow.  Positive right radial pulse.  

Patient is able flex and extend the right wrist and move the digits, limited 

range of motion secondary to pain.  He will not flex the right elbow, however, 

passively I'm able to flex the elbow to almost 90.  No pain or tenderness over 

the right shoulder or right clavicle.


NEUROLOGICAL: Awake and alert. No obvious cranial nerve deficits.  Motor 

grossly within normal limits. Normal speech.  Sensation was intact to the radial

, median, and ulnar distribution of the right hand.


PSYCHIATRIC: Appropriate mood and affect; insight and judgment normal.





Data


Data


Last Documented VS





Vital Signs








  Date Time  Temp Pulse Resp B/P (MAP) Pulse Ox O2 Delivery O2 Flow Rate FiO2


 


2/7/18 15:52 98.0 101 19 123/85 (98) 99 Room Air  








Orders





 Orders


Elbow, Complete (4 Vws) (2/7/18 )


Acetamin-Hydrocod 325-5 Mg (Norco  5-325 (2/7/18 16:15)


Support Splint (2/7/18 16:50)








MDM


Medical Decision Making


Medical Screen Exam Complete:  Yes


Emergency Medical Condition:  Yes


Medical Record Reviewed:  Yes


Interpretation(s)


X-ray reveals degenerative changes.  Negative for fracture.


Differential Diagnosis


Differential diagnosis includes fracture, dislocation, contusion, hematoma, 

sprain, strain.


Narrative Course


X-ray of the right elbow was obtained.  The patient was administered Norco 5 mg/

325 mg orally for pain.  The patient was placed in a sling for, for.  He will 

be prescribed ibuprofen as needed.  He is discharged in police custody.





Diagnosis





 Primary Impression:  


 Right elbow pain


Patient Instructions:  General Instructions





***Additional Instructions:  


Sling as needed.  Ibuprofen as needed.  Elevate and ice.  Follow-up with a 

primary physician and/or orthopedist.  Return if symptoms worsen or progress.


***Med/Other Pt SpecificInfo:  Prescription(s) given


Scripts


Ibuprofen (Ibuprofen) 600 Mg Tab


600 MG PO Q6H Y for Pain/Inflammation, #20 TAB 0 Refills


   Prov: Yfn Veliz MD         2/7/18


Disposition:  01 DISCHARGE HOME


Condition:  Stable











Yfn Veliz MD Feb 7, 2018 16:11

## 2018-05-12 ENCOUNTER — HOSPITAL ENCOUNTER (EMERGENCY)
Dept: HOSPITAL 17 - NEPC | Age: 33
Discharge: HOME | End: 2018-05-12
Payer: SELF-PAY

## 2018-05-12 VITALS
RESPIRATION RATE: 14 BRPM | TEMPERATURE: 102.9 F | HEART RATE: 112 BPM | SYSTOLIC BLOOD PRESSURE: 131 MMHG | DIASTOLIC BLOOD PRESSURE: 82 MMHG | OXYGEN SATURATION: 100 %

## 2018-05-12 VITALS — RESPIRATION RATE: 22 BRPM | OXYGEN SATURATION: 98 %

## 2018-05-12 VITALS — BODY MASS INDEX: 24.49 KG/M2 | WEIGHT: 165.35 LBS | HEIGHT: 69 IN

## 2018-05-12 VITALS — TEMPERATURE: 99.3 F | HEART RATE: 95 BPM

## 2018-05-12 DIAGNOSIS — J40: Primary | ICD-10-CM

## 2018-05-12 DIAGNOSIS — I10: ICD-10-CM

## 2018-05-12 DIAGNOSIS — J02.9: ICD-10-CM

## 2018-05-12 DIAGNOSIS — B95.4: ICD-10-CM

## 2018-05-12 DIAGNOSIS — E11.9: ICD-10-CM

## 2018-05-12 DIAGNOSIS — Z72.0: ICD-10-CM

## 2018-05-12 DIAGNOSIS — F14.10: ICD-10-CM

## 2018-05-12 DIAGNOSIS — M10.9: ICD-10-CM

## 2018-05-12 DIAGNOSIS — R94.31: ICD-10-CM

## 2018-05-12 DIAGNOSIS — G89.29: ICD-10-CM

## 2018-05-12 DIAGNOSIS — R07.89: ICD-10-CM

## 2018-05-12 LAB
ALBUMIN SERPL-MCNC: 3.5 GM/DL (ref 3.4–5)
ALP SERPL-CCNC: 110 U/L (ref 45–117)
ALT SERPL-CCNC: 26 U/L (ref 12–78)
AST SERPL-CCNC: 29 U/L (ref 15–37)
BASOPHILS # BLD AUTO: 0 TH/MM3 (ref 0–0.2)
BASOPHILS NFR BLD: 0.2 % (ref 0–2)
BILIRUB SERPL-MCNC: 0.8 MG/DL (ref 0.2–1)
BUN SERPL-MCNC: 11 MG/DL (ref 7–18)
CALCIUM SERPL-MCNC: 9.4 MG/DL (ref 8.5–10.1)
CHLORIDE SERPL-SCNC: 94 MEQ/L (ref 98–107)
COLOR UR: YELLOW
CREAT SERPL-MCNC: 1 MG/DL (ref 0.6–1.3)
EOSINOPHIL # BLD: 0 TH/MM3 (ref 0–0.4)
EOSINOPHIL NFR BLD: 0.1 % (ref 0–4)
ERYTHROCYTE [DISTWIDTH] IN BLOOD BY AUTOMATED COUNT: 15.2 % (ref 11.6–17.2)
GFR SERPLBLD BASED ON 1.73 SQ M-ARVRAT: 87 ML/MIN (ref 89–?)
GLUCOSE SERPL-MCNC: 134 MG/DL (ref 74–106)
GLUCOSE UR STRIP-MCNC: (no result) MG/DL
HCO3 BLD-SCNC: 29 MEQ/L (ref 21–32)
HCT VFR BLD CALC: 42.2 % (ref 39–51)
HGB BLD-MCNC: 15.1 GM/DL (ref 13–17)
HGB UR QL STRIP: (no result)
KETONES UR STRIP-MCNC: (no result) MG/DL
LYMPHOCYTES # BLD AUTO: 0.7 TH/MM3 (ref 1–4.8)
LYMPHOCYTES NFR BLD AUTO: 6.9 % (ref 9–44)
MCH RBC QN AUTO: 31.6 PG (ref 27–34)
MCHC RBC AUTO-ENTMCNC: 35.7 % (ref 32–36)
MCV RBC AUTO: 88.7 FL (ref 80–100)
MONOCYTE #: 0.6 TH/MM3 (ref 0–0.9)
MONOCYTES NFR BLD: 5.8 % (ref 0–8)
MUCOUS THREADS #/AREA URNS LPF: (no result) /LPF
NEUTROPHILS # BLD AUTO: 9.1 TH/MM3 (ref 1.8–7.7)
NEUTROPHILS NFR BLD AUTO: 87 % (ref 16–70)
NITRITE UR QL STRIP: (no result)
PLATELET # BLD: 156 TH/MM3 (ref 150–450)
PMV BLD AUTO: 7.9 FL (ref 7–11)
PROT SERPL-MCNC: 7.5 GM/DL (ref 6.4–8.2)
RBC # BLD AUTO: 4.76 MIL/MM3 (ref 4.5–5.9)
SODIUM SERPL-SCNC: 132 MEQ/L (ref 136–145)
SP GR UR STRIP: 1.02 (ref 1–1.03)
SQUAMOUS #/AREA URNS HPF: <1 /HPF (ref 0–5)
URINE LEUKOCYTE ESTERASE: (no result)
WBC # BLD AUTO: 10.5 TH/MM3 (ref 4–11)

## 2018-05-12 PROCEDURE — 99285 EMERGENCY DEPT VISIT HI MDM: CPT

## 2018-05-12 PROCEDURE — 87205 SMEAR GRAM STAIN: CPT

## 2018-05-12 PROCEDURE — 87040 BLOOD CULTURE FOR BACTERIA: CPT

## 2018-05-12 PROCEDURE — 87081 CULTURE SCREEN ONLY: CPT

## 2018-05-12 PROCEDURE — 81001 URINALYSIS AUTO W/SCOPE: CPT

## 2018-05-12 PROCEDURE — 71045 X-RAY EXAM CHEST 1 VIEW: CPT

## 2018-05-12 PROCEDURE — 87804 INFLUENZA ASSAY W/OPTIC: CPT

## 2018-05-12 PROCEDURE — 96361 HYDRATE IV INFUSION ADD-ON: CPT

## 2018-05-12 PROCEDURE — 87880 STREP A ASSAY W/OPTIC: CPT

## 2018-05-12 PROCEDURE — 93005 ELECTROCARDIOGRAM TRACING: CPT

## 2018-05-12 PROCEDURE — 85025 COMPLETE CBC W/AUTO DIFF WBC: CPT

## 2018-05-12 PROCEDURE — 80307 DRUG TEST PRSMV CHEM ANLYZR: CPT

## 2018-05-12 PROCEDURE — 96360 HYDRATION IV INFUSION INIT: CPT

## 2018-05-12 PROCEDURE — 80053 COMPREHEN METABOLIC PANEL: CPT

## 2018-05-12 PROCEDURE — 83605 ASSAY OF LACTIC ACID: CPT

## 2018-05-12 NOTE — RADRPT
EXAM DATE/TIME:  05/12/2018 18:38 

 

[facility] COMPARISON:     

CHEST SINGLE AP, December 31, 2017, 18:27.

 

                     

INDICATIONS :     

Chest Pain

                     

 

MEDICAL HISTORY :     

Diabetes mellitus type I.          

 

SURGICAL HISTORY :        

 

ENCOUNTER:     

Initial                                        

 

ACUITY:     

1 day      

 

PAIN SCORE:     

9/10

 

LOCATION:      

chest 

 

FINDINGS:     

A single view of the chest demonstrates the lungs to be symmetrically aerated without evidence of mas
s, infiltrate or effusion.  The cardiomediastinal contours are unremarkable.  Osseous structures are 
intact.

 

CONCLUSION:     

No acute disease.  No significant change has occurred.  

 

 

 

 Naveen Lowe MD on May 12, 2018 at 19:04           

Board Certified Radiologist.

 This report was verified electronically.

## 2018-05-12 NOTE — PD
HPI


Chief Complaint:  Fever


Time Seen by Provider:  19:33


Travel History


International Travel<30 days:  No


Contact w/Intl Traveler<30days:  No


Traveled to known affect area:  No





History of Present Illness


HPI


32-year-old male patient with history of cocaine abuse, right elbow area gout 

complicated by malpractice injury, multiple injuries in the past, diabetes, 

recently released from group home, here because he has been having poor appetite, 

fevers, nausea, left-sided chest discomfort, sore throat with swallowing.  She 

has not been eating well according to his partner.  He has vomited several 

times.  He denies any diarrhea, states he has been constipated as well.  He has 

been taking Dilaudid which he states he supposed to be on for all his chronic 

pain.





Modifying Factors: None


Associated Signs & Symptoms: Fevers, chest discomfort, nausea, poor appetite, 

vomiting


Risk Factors: History of drug use, denies IV drug use





PFSH


Past Medical History


Hx Anticoagulant Therapy:  No


Arthritis:  Yes (R elbow and back)


Anxiety:  Yes


Depression:  Yes


Heart Rhythm Problems:  Yes ("born with heart murmur" per pt. verbalization)


Cancer:  No


Cardiovascular Problems:  Yes


High Cholesterol:  No


Chemotherapy:  No


Chest Pain:  No


Congestive Heart Failure:  No


Cerebrovascular Accident:  No


Diabetes:  Yes


Diminished Hearing:  No


Endocrine:  No


Gastrointestinal Disorders:  No


Genitourinary:  No


Headaches:  No


Hypertension:  Yes


Immune Disorder:  No


Musculoskeletal:  Yes


Neurologic:  Yes


Psychiatric:  Yes


Reproductive:  No


Respiratory:  No


Immunizations Current:  No


Migraines:  No


Radiation Therapy:  No


Seizures:  No





Past Surgical History


Abdominal Surgery:  No


Cardiac Surgery:  No


Ear Surgery:  No


Endocrine Surgery:  No


Eye Surgery:  No


Genitourinary Surgery:  No


Gynecologic Surgery:  No


Hysterectomy:  No


Neurologic Surgery:  No


Oral Surgery:  No


Pacemaker:  No


Thoracic Surgery:  No


Other Surgery:  Yes (right arm )





Social History


Alcohol Use:  No


Tobacco Use:  Yes (1/2 ppd)


Substance Use:  Yes (COKE)





Allergies-Medications


(Allergen,Severity, Reaction):  


Coded Allergies:  


     No Known Allergies (Verified  Allergy, Unknown, 2/7/18)


Reported Meds & Prescriptions





Reported Meds & Active Scripts


Active


Reported


Hydromorphone (Hydromorphone HCl) 8 Mg Tab 8 Mg PO TID PRN








Review of Systems


Except as stated in HPI:  all other systems reviewed are Neg





Physical Exam


Narrative


GENERAL: Well-developed young male patient currently in moderate distress.  

Awake and oriented 3.


SKIN: Focused skin assessment warm/dry.


HEAD: Atraumatic. Normocephalic. 


EYES: Pupils equal and round. No scleral icterus. No injection or drainage. 


ENT: No nasal bleeding or discharge.  Mucous membranes pink and moist.  Mild 

pharyngeal erythema.


NECK: Trachea midline. No JVD.  Supple.


CARDIOVASCULAR: Regular rate and rhythm.  No murmur appreciated.


RESPIRATORY: No accessory muscle use. Clear to auscultation. Breath sounds 

equal bilaterally. 


GASTROINTESTINAL: Abdomen soft, non-tender, nondistended. Hepatic and splenic 

margins not palpable. 


MUSCULOSKELETAL: No obvious deformities. No clubbing.  No cyanosis.  No edema. 


NEUROLOGICAL: Awake and alert. No obvious cranial nerve deficits.  Motor 

grossly within normal limits. Normal speech.


PSYCHIATRIC: Appropriate mood and affect; insight and judgment normal.





Data


Data


Last Documented VS





Vital Signs








  Date Time  Temp Pulse Resp B/P (MAP) Pulse Ox O2 Delivery O2 Flow Rate FiO2


 


5/12/18 20:14 99.3 95      


 


5/12/18 18:27   22  98 Room Air  


 


5/12/18 18:05       2.00 


 


5/12/18 18:01    131/82 (98)    








Orders





 Orders


Sepsis Workup Initiated (5/12/18 )


Complete Blood Count With Diff (5/12/18 18:18)


Comprehensive Metabolic Panel (5/12/18 18:18)


Lactic Acid Sepsis Protocol (5/12/18 18:18)


Urinalysis - C+S If Indicated (5/12/18 18:18)


Blood Culture (5/12/18 18:18)


Chest, Single Ap (5/12/18 18:18)


Ecg Monitoring (5/12/18 18:18)


Iv Access Insert/Monitor (5/12/18 18:18)


Oximetry (5/12/18 18:18)


Acetaminophen (Tylenol) (5/12/18 18:30)


Sodium Chlor 0.9% 1000 Ml Inj (Ns 1000 M (5/12/18 18:30)


Influenzae A/B Antigen (5/12/18 19:33)


Group A Rapid Strep Screen (5/12/18 19:39)


Sodium Chlor 0.9% 1000 Ml Inj (Ns 1000 M (5/12/18 19:45)


Electrocardiogram (5/12/18 18:04)


Strep Culture (Group A) (5/12/18 20:09)


Drug Screen, Random Urine (5/12/18 20:47)





Labs





Laboratory Tests








Test


  5/12/18


18:30 5/12/18


18:35 5/12/18


21:22


 


White Blood Count 10.5 TH/MM3   


 


Red Blood Count 4.76 MIL/MM3   


 


Hemoglobin 15.1 GM/DL   


 


Hematocrit 42.2 %   


 


Mean Corpuscular Volume 88.7 FL   


 


Mean Corpuscular Hemoglobin 31.6 PG   


 


Mean Corpuscular Hemoglobin


Concent 35.7 % 


  


  


 


 


Red Cell Distribution Width 15.2 %   


 


Platelet Count 156 TH/MM3   


 


Mean Platelet Volume 7.9 FL   


 


Neutrophils (%) (Auto) 87.0 %   


 


Lymphocytes (%) (Auto) 6.9 %   


 


Monocytes (%) (Auto) 5.8 %   


 


Eosinophils (%) (Auto) 0.1 %   


 


Basophils (%) (Auto) 0.2 %   


 


Neutrophils # (Auto) 9.1 TH/MM3   


 


Lymphocytes # (Auto) 0.7 TH/MM3   


 


Monocytes # (Auto) 0.6 TH/MM3   


 


Eosinophils # (Auto) 0.0 TH/MM3   


 


Basophils # (Auto) 0.0 TH/MM3   


 


CBC Comment DIFF FINAL   


 


Differential Comment    


 


Blood Urea Nitrogen 11 MG/DL   


 


Creatinine 1.00 MG/DL   


 


Random Glucose 134 MG/DL   


 


Total Protein 7.5 GM/DL   


 


Albumin 3.5 GM/DL   


 


Calcium Level 9.4 MG/DL   


 


Alkaline Phosphatase 110 U/L   


 


Aspartate Amino Transf


(AST/SGOT) 29 U/L 


  


  


 


 


Alanine Aminotransferase


(ALT/SGPT) 26 U/L 


  


  


 


 


Total Bilirubin 0.8 MG/DL   


 


Sodium Level 132 MEQ/L   


 


Potassium Level 4.2 MEQ/L   


 


Chloride Level 94 MEQ/L   


 


Carbon Dioxide Level 29.0 MEQ/L   


 


Anion Gap 9 MEQ/L   


 


Estimat Glomerular Filtration


Rate 87 ML/MIN 


  


  


 


 


Lactic Acid Level  1.0 mmol/L  


 


Urine Color   YELLOW 


 


Urine Turbidity   CLEAR 


 


Urine pH   8.5 


 


Urine Specific Gravity   1.023 


 


Urine Protein   TRACE mg/dL 


 


Urine Glucose (UA)   NEG mg/dL 


 


Urine Ketones   NEG mg/dL 


 


Urine Occult Blood   NEG 


 


Urine Nitrite   NEG 


 


Urine Bilirubin   NEG 


 


Urine Urobilinogen


  


  


  LESS THAN 2.0


MG/DL


 


Urine Leukocyte Esterase   NEG 


 


Urine RBC   1 /hpf 


 


Urine WBC


  


  


  LESS THAN 1


/hpf


 


Urine Squamous Epithelial


Cells 


  


  <1 /hpf 


 


 


Urine Mucus   FEW /lpf 


 


Microscopic Urinalysis Comment


  


  


  CATH-CULT NOT


IND


 


Urine Opiates Screen   POS 


 


Urine Barbiturates Screen   NEG 


 


Urine Amphetamines Screen   NEG 


 


Urine Benzodiazepines Screen   NEG 


 


Urine Cocaine Screen   NEG 


 


Urine Cannabinoids Screen   NEG 











MDM


Medical Decision Making


Medical Screen Exam Complete:  Yes


Emergency Medical Condition:  Yes


Medical Record Reviewed:  Yes


Interpretation(s)





Laboratory Tests








Test


  5/12/18


18:30 5/12/18


18:35 5/12/18


21:22


 


Neutrophils (%) (Auto)


  87.0 %


(16.0-70.0) 


  


 


 


Lymphocytes (%) (Auto)


  6.9 %


(9.0-44.0) 


  


 


 


Neutrophils # (Auto)


  9.1 TH/MM3


(1.8-7.7) 


  


 


 


Lymphocytes # (Auto)


  0.7 TH/MM3


(1.0-4.8) 


  


 


 


Random Glucose


  134 MG/DL


() 


  


 


 


Sodium Level


  132 MEQ/L


(136-145) 


  


 


 


Chloride Level


  94 MEQ/L


() 


  


 


 


Estimat Glomerular Filtration


Rate 87 ML/MIN


(>89) 


  


 


 


Urine Mucus   FEW /lpf (OCC) 


 


Urine Opiates Screen   POS (NEG) 








Last 24 hours Impressions








Chest X-Ray 5/12/18 1818 Signed





Impressions: 





 Service Date/Time:  Saturday, May 12, 2018 18:38 - CONCLUSION:  No acute 





 disease.  No significant change has occurred.       Naveen Lowe MD 








Differential Diagnosis


Sepsis versus pneumonia versus gastroenteritis versus influenza versus strep 

pharyngitis versus endocarditis


Narrative Course


Chest x-ray did not show any signs of acute pulmonary processes.  Lab work was 

fairly unremarkable.  His lactate was normal.  He did not have significant 

leukocytosis.  Influenza testing and rapid strep testing was negative.  I have 

questioned him again about IV drug use and have stressed the importance of 

telling me since this could put him at risk for endocarditis and other types of 

infections that would not be considered otherwise in a normal person, and the 

patient absolutely denies any previous IV drug use.  At this point, I did not 

see anything else unusual and I suspect that this more likely to be a 

bronchitis.  My plan would be to treat him for bronchitis and have him follow-

up with primary care doctor.  Return for worsening in symptoms as needed.  The 

plan has been discussed with him and he states understanding.





Diagnosis





 Primary Impression:  


 Bronchitis


***Med/Other Pt SpecificInfo:  Prescription(s) given


Scripts


Doxycycline Hyclate (Doxycycline Hyclate) 100 Mg Cap


100 MG PO BID for Infection for 7 Days, #14 CAP 0 Refills


   Prov: Edward Zavala MD         5/12/18 


Ibuprofen (Ibuprofen) 600 Mg Tab


600 MG PO Q6H Y for Pain/Inflammation, #20 TAB 0 Refills


   Prov: Edward Zavala MD         5/12/18


Disposition:  01 DISCHARGE HOME


Condition:  Stable











Edward Zavala MD May 12, 2018 19:45

## 2018-05-13 NOTE — EKG
Date Performed: 05/12/2018       Time Performed: 20:23:37

 

PTAGE:      32 years

 

EKG:      Sinus rhythm 

 

 WITH SHORT [place] INTERVAL BORDERLINE RIGHT AXIS DEVIATION INCOMPLETE RIGHT BUNDLE BRANCH BLOCK MILD DIF
FUSE ST ELEVATION C/W EARLY REPOLARIZATION ABNORMAL ECG

 

PREVIOUS TRACING       : 05/12/2018 18.04 Compared to previous tracing,  rate slower

 

DOCTOR:   Haleigh Arevalo  Interpretating Date/Time  05/13/2018 17:25:28

## 2018-05-13 NOTE — EKG
Date Performed: 05/12/2018       Time Performed: 18:04:20

 

PTAGE:      32 years

 

EKG:      SINUS TACHYCARDIA WITH SHORT [place] INTERVAL RIGHT AXIS ST ELEVATION CONSISTENT WITH EARLY REPO
LARIZATION ABNORMAL ECG

 

PREVIOUS TRACING       : 12/31/2017 18.39 Since the previous tracing, no significant change noted

 

DOCTOR:   Haleigh Arevalo  Interpretating Date/Time  05/13/2018 17:28:30

## 2018-05-22 ENCOUNTER — HOSPITAL ENCOUNTER (EMERGENCY)
Dept: HOSPITAL 17 - NEPE | Age: 33
Discharge: HOME | End: 2018-05-22
Payer: SELF-PAY

## 2018-05-22 VITALS — HEIGHT: 69 IN | WEIGHT: 171.96 LBS | BODY MASS INDEX: 25.47 KG/M2

## 2018-05-22 VITALS
TEMPERATURE: 98 F | RESPIRATION RATE: 19 BRPM | OXYGEN SATURATION: 98 % | HEART RATE: 76 BPM | DIASTOLIC BLOOD PRESSURE: 90 MMHG | SYSTOLIC BLOOD PRESSURE: 134 MMHG

## 2018-05-22 VITALS
RESPIRATION RATE: 24 BRPM | DIASTOLIC BLOOD PRESSURE: 100 MMHG | SYSTOLIC BLOOD PRESSURE: 158 MMHG | HEART RATE: 125 BPM | TEMPERATURE: 99 F

## 2018-05-22 DIAGNOSIS — R53.1: ICD-10-CM

## 2018-05-22 DIAGNOSIS — I10: ICD-10-CM

## 2018-05-22 DIAGNOSIS — R11.2: ICD-10-CM

## 2018-05-22 DIAGNOSIS — Z79.899: ICD-10-CM

## 2018-05-22 DIAGNOSIS — F41.8: ICD-10-CM

## 2018-05-22 DIAGNOSIS — E11.9: ICD-10-CM

## 2018-05-22 DIAGNOSIS — Z87.39: ICD-10-CM

## 2018-05-22 DIAGNOSIS — Z86.79: ICD-10-CM

## 2018-05-22 DIAGNOSIS — J02.0: Primary | ICD-10-CM

## 2018-05-22 DIAGNOSIS — Z86.69: ICD-10-CM

## 2018-05-22 DIAGNOSIS — R07.9: ICD-10-CM

## 2018-05-22 DIAGNOSIS — R19.7: ICD-10-CM

## 2018-05-22 DIAGNOSIS — R06.00: ICD-10-CM

## 2018-05-22 DIAGNOSIS — R94.31: ICD-10-CM

## 2018-05-22 DIAGNOSIS — F17.200: ICD-10-CM

## 2018-05-22 LAB
BASOPHILS # BLD AUTO: 0 TH/MM3 (ref 0–0.2)
BASOPHILS NFR BLD: 0.3 % (ref 0–2)
BUN SERPL-MCNC: 8 MG/DL (ref 7–18)
CALCIUM SERPL-MCNC: 8.3 MG/DL (ref 8.5–10.1)
CHLORIDE SERPL-SCNC: 101 MEQ/L (ref 98–107)
CREAT SERPL-MCNC: 0.84 MG/DL (ref 0.6–1.3)
EOSINOPHIL # BLD: 0.3 TH/MM3 (ref 0–0.4)
EOSINOPHIL NFR BLD: 2.7 % (ref 0–4)
ERYTHROCYTE [DISTWIDTH] IN BLOOD BY AUTOMATED COUNT: 14.6 % (ref 11.6–17.2)
GFR SERPLBLD BASED ON 1.73 SQ M-ARVRAT: 107 ML/MIN (ref 89–?)
GLUCOSE SERPL-MCNC: 129 MG/DL (ref 74–106)
HCO3 BLD-SCNC: 28.2 MEQ/L (ref 21–32)
HCT VFR BLD CALC: 35.2 % (ref 39–51)
HGB BLD-MCNC: 12 GM/DL (ref 13–17)
LYMPHOCYTES # BLD AUTO: 2.1 TH/MM3 (ref 1–4.8)
LYMPHOCYTES NFR BLD AUTO: 21.3 % (ref 9–44)
MCH RBC QN AUTO: 31.1 PG (ref 27–34)
MCHC RBC AUTO-ENTMCNC: 34.1 % (ref 32–36)
MCV RBC AUTO: 91.2 FL (ref 80–100)
MONOCYTE #: 0.7 TH/MM3 (ref 0–0.9)
MONOCYTES NFR BLD: 6.7 % (ref 0–8)
NEUTROPHILS # BLD AUTO: 6.9 TH/MM3 (ref 1.8–7.7)
NEUTROPHILS NFR BLD AUTO: 69 % (ref 16–70)
PLATELET # BLD: 299 TH/MM3 (ref 150–450)
PMV BLD AUTO: 6.7 FL (ref 7–11)
RBC # BLD AUTO: 3.86 MIL/MM3 (ref 4.5–5.9)
SODIUM SERPL-SCNC: 139 MEQ/L (ref 136–145)
TROPONIN I SERPL-MCNC: (no result) NG/ML (ref 0.02–0.05)
WBC # BLD AUTO: 10 TH/MM3 (ref 4–11)

## 2018-05-22 PROCEDURE — 87205 SMEAR GRAM STAIN: CPT

## 2018-05-22 PROCEDURE — 83605 ASSAY OF LACTIC ACID: CPT

## 2018-05-22 PROCEDURE — 86403 PARTICLE AGGLUT ANTBDY SCRN: CPT

## 2018-05-22 PROCEDURE — 80307 DRUG TEST PRSMV CHEM ANLYZR: CPT

## 2018-05-22 PROCEDURE — 99285 EMERGENCY DEPT VISIT HI MDM: CPT

## 2018-05-22 PROCEDURE — 87040 BLOOD CULTURE FOR BACTERIA: CPT

## 2018-05-22 PROCEDURE — 71046 X-RAY EXAM CHEST 2 VIEWS: CPT

## 2018-05-22 PROCEDURE — 87186 SC STD MICRODIL/AGAR DIL: CPT

## 2018-05-22 PROCEDURE — 82550 ASSAY OF CK (CPK): CPT

## 2018-05-22 PROCEDURE — 76937 US GUIDE VASCULAR ACCESS: CPT

## 2018-05-22 PROCEDURE — 71275 CT ANGIOGRAPHY CHEST: CPT

## 2018-05-22 PROCEDURE — 85025 COMPLETE CBC W/AUTO DIFF WBC: CPT

## 2018-05-22 PROCEDURE — 84484 ASSAY OF TROPONIN QUANT: CPT

## 2018-05-22 PROCEDURE — 93005 ELECTROCARDIOGRAM TRACING: CPT

## 2018-05-22 PROCEDURE — 85379 FIBRIN DEGRADATION QUANT: CPT

## 2018-05-22 PROCEDURE — 96366 THER/PROPH/DIAG IV INF ADDON: CPT

## 2018-05-22 PROCEDURE — 96365 THER/PROPH/DIAG IV INF INIT: CPT

## 2018-05-22 PROCEDURE — 80048 BASIC METABOLIC PNL TOTAL CA: CPT

## 2018-05-22 NOTE — RADRPT
EXAM DATE:  5/22/2018 12:25 PM EDT

AGE/SEX:        32 years / Male



INDICATIONS:  Chest pressure, pain with breathing and swallowing, and vomiting.



CLINICAL DATA:  This is the patient's initial encounter. Patient reports that signs and symptoms have
 been present for 2 days and indicates a pain score of 10/10. 

                                                                          

MEDICAL/SURGICAL HISTORY:       . Heart murmur.  None.



COMPARISON:      No prior [facility] exams available for comparison.



FINDINGS:  PA and lateral views of the chest demonstrate the lungs to be symmetrically aerated withou
t evidence of mass, infiltrate or effusion. The cardiomediastinal contours are unremarkable. Osseous 
structures are intact.



CONCLUSION: No acute intrathoracic disease.



Electronically signed by: Naveen Lowe MD  5/22/2018 12:27 PM EDT

## 2018-05-22 NOTE — PD
HPI


Chief Complaint:  Chest Pain


Time Seen by Provider:  12:29


Travel History


International Travel<30 days:  No


Contact w/Intl Traveler<30days:  No


Traveled to known affect area:  No





History of Present Illness


HPI


Patient is a 32-year-old male with history of chronic pain, depression, anxiety

, hypertension, diabetes, who presents the emergency room with multiple 

complaints.  Patient reports that for the past 2 weeks he has not been feeling 

well and has been having left-sided chest pain, shortness of breath, nausea, 

vomiting, diarrhea, sore throat, postnasal drip, generalized weakness.  Patient 

reports that his biggest complaint is his sore throat, patient reports that it 

hurts when he swallows, reports that he also has been feeling nauseous and has 

vomited multiple times.  Patient denies any abdominal pain, denies any 

constipation or diarrhea.  Patient reports overall decreased appetite.  As per 

patient's chest pain, patient reports that he has been having sharp stabbing 

chest pain to his left chest, reports that pain is constant, nothing makes pain 

better or worse.  Patient denies history of ACS, cardiac stents in the past.  

Patient denies IV drug abuse.  Patient denies any fevers or chills.  Patient 

reports that [employer] did call him as he had a positive culture as he was seen 

on May 12, 2018 and he was told to come to the emergency room for evaluation. 

During that visit, he was sent home with a script for doxy for treatment of 

bronchitis.





PFSH


Past Medical History


Hx Anticoagulant Therapy:  No


Arthritis:  Yes (R elbow and back)


Anxiety:  Yes


Depression:  Yes


Heart Rhythm Problems:  Yes ("born with heart murmur" per pt. verbalization)


Cancer:  No


Cardiovascular Problems:  Yes


High Cholesterol:  No


Chemotherapy:  No


Chest Pain:  No


Congestive Heart Failure:  No


Cerebrovascular Accident:  No


Diabetes:  Yes


Diminished Hearing:  No


Endocrine:  No


Gastrointestinal Disorders:  No


Genitourinary:  No


Headaches:  No


Hypertension:  Yes


Immune Disorder:  No


Musculoskeletal:  Yes


Neurologic:  Yes


Psychiatric:  Yes


Reproductive:  No


Respiratory:  No


Immunizations Current:  No


Migraines:  No


Radiation Therapy:  No


Seizures:  No


Pregnant?:  Not Pregnant





Past Surgical History


Abdominal Surgery:  No


Cardiac Surgery:  No


Ear Surgery:  No


Endocrine Surgery:  No


Eye Surgery:  No


Genitourinary Surgery:  No


Gynecologic Surgery:  No


Hysterectomy:  No


Neurologic Surgery:  No


Oral Surgery:  No


Pacemaker:  No


Thoracic Surgery:  No


Other Surgery:  Yes (right arm )





Social History


Alcohol Use:  Yes (occ)


Tobacco Use:  Yes (1/2 ppd)


Substance Use:  Yes





Allergies-Medications


(Allergen,Severity, Reaction):  


Coded Allergies:  


     No Known Allergies (Verified  Allergy, Unknown, 2/7/18)


Reported Meds & Prescriptions





Reported Meds & Active Scripts


Active


Doxycycline Hyclate 100 Mg Cap 100 Mg PO BID 7 Days


Reported


Robaxin (Methocarbamol) 750 Mg Tab 750 Mg PO BID


Hydromorphone (Hydromorphone HCl) 8 Mg Tab 8 Mg PO TID PRN








Review of Systems


General / Constitutional:  No: Fever


Eyes:  No: Visual changes


HENT:  Positive: Sore Throat, No: Headaches


Cardiovascular:  No: Chest Pain or Discomfort


Respiratory:  Positive: Cough, Shortness of Breath


Gastrointestinal:  Positive: Nausea, Vomiting, No: Diarrhea, Abdominal Pain


Genitourinary:  No: Dysuria


Musculoskeletal:  No: Pain


Skin:  No Rash


Neurologic:  Positive: Weakness, Dizziness


Psychiatric:  Positive: Anxiety, No: Depression


Endocrine:  No: Polydipsia


Hematologic/Lymphatic:  No: Easy Bruising





Physical Exam


Narrative


GENERAL: moderate distress


SKIN: Focused skin assessment warm/dry.


HEAD: Atraumatic. Normocephalic. 


EYES: Pupils equal and round. No scleral icterus. No injection or drainage. 


ENT: No nasal bleeding or discharge.  Mucous membranes pink and moist.


NECK: Trachea midline. No JVD. 


CARDIOVASCULAR: Tachycardic.  No murmur appreciated.


RESPIRATORY: No accessory muscle use. Clear to auscultation. Breath sounds 

equal bilaterally. 


GASTROINTESTINAL: Abdomen soft, non-tender, nondistended. Hepatic and splenic 

margins not palpable. 


MUSCULOSKELETAL: No obvious deformities. No clubbing.  No cyanosis.  No edema. 


NEUROLOGICAL: Awake and alert. No obvious cranial nerve deficits.  Motor 

grossly within normal limits. Normal speech.


PSYCHIATRIC: Anxious mood and affect; insight and judgment normal.





Data


Data


Last Documented VS





Vital Signs








  Date Time  Temp Pulse Resp B/P (MAP) Pulse Ox O2 Delivery O2 Flow Rate FiO2


 


5/22/18 17:23 98.0 76 19 134/90 (105) 98 Room Air  








Orders





 Orders


Electrocardiogram (5/22/18 11:34)


Complete Blood Count With Diff (5/22/18 11:34)


Basic Metabolic Panel (Bmp) (5/22/18 11:34)


Ckmb (Isoenzyme) Profile (5/22/18 11:34)


Troponin I (5/22/18 11:34)


Iv Access Insert/Monitor (5/22/18 11:34)


Ecg Monitoring (5/22/18 11:34)


Oxygen Administration (5/22/18 11:34)


Oximetry (5/22/18 11:34)


Chest, Pa & Lat (5/22/18 11:34)


Blood Culture (5/22/18 12:29)


D-Dimer (5/22/18 12:47)


Sodium Chlor 0.9% 1000 Ml Inj (Ns 1000 M (5/22/18 13:00)


Drug Screen, Random Urine (5/22/18 12:51)


Lactic Acid Sepsis Protocol (5/22/18 13:01)


Piperacil-Tazo 3.375 Gm Premix (Zosyn 3. (5/22/18 13:15)


Vascular Access Team Consult/P PRN (5/22/18 13:07)


Vascular Poc Ultrasound (5/22/18 )


Ct Pulmonary Angiogram (5/22/18 14:53)


Troponin I (5/22/18 16:09)


Iohexol 350 Inj (Omnipaque 350 Inj) (5/22/18 16:34)





Labs





Laboratory Tests








Test


  5/22/18


13:19 5/22/18


13:30 5/22/18


14:00 5/22/18


16:47


 


Urine Opiates Screen POS    


 


Urine Barbiturates Screen NEG    


 


Urine Amphetamines Screen NEG    


 


Urine Benzodiazepines Screen NEG    


 


Urine Cocaine Screen NEG    


 


Urine Cannabinoids Screen NEG    


 


D-Dimer Quantitative (PE/DVT)  1.28 MG/L FEU   


 


Lactic Acid Level  1.7 mmol/L   


 


White Blood Count   10.0 TH/MM3  


 


Red Blood Count   3.86 MIL/MM3  


 


Hemoglobin   12.0 GM/DL  


 


Hematocrit   35.2 %  


 


Mean Corpuscular Volume   91.2 FL  


 


Mean Corpuscular Hemoglobin   31.1 PG  


 


Mean Corpuscular Hemoglobin


Concent 


  


  34.1 % 


  


 


 


Red Cell Distribution Width   14.6 %  


 


Platelet Count   299 TH/MM3  


 


Mean Platelet Volume   6.7 FL  


 


Neutrophils (%) (Auto)   69.0 %  


 


Lymphocytes (%) (Auto)   21.3 %  


 


Monocytes (%) (Auto)   6.7 %  


 


Eosinophils (%) (Auto)   2.7 %  


 


Basophils (%) (Auto)   0.3 %  


 


Neutrophils # (Auto)   6.9 TH/MM3  


 


Lymphocytes # (Auto)   2.1 TH/MM3  


 


Monocytes # (Auto)   0.7 TH/MM3  


 


Eosinophils # (Auto)   0.3 TH/MM3  


 


Basophils # (Auto)   0.0 TH/MM3  


 


CBC Comment   DIFF FINAL  


 


Differential Comment     


 


Blood Urea Nitrogen   8 MG/DL  


 


Creatinine   0.84 MG/DL  


 


Random Glucose   129 MG/DL  


 


Calcium Level   8.3 MG/DL  


 


Sodium Level   139 MEQ/L  


 


Potassium Level   3.4 MEQ/L  


 


Chloride Level   101 MEQ/L  


 


Carbon Dioxide Level   28.2 MEQ/L  


 


Anion Gap   10 MEQ/L  


 


Estimat Glomerular Filtration


Rate 


  


  107 ML/MIN 


  


 


 


Total Creatine Kinase   49 U/L  


 


Troponin I


  


  


  LESS THAN 0.02


NG/ML LESS THAN 0.02


NG/ML











MDM


Medical Decision Making


Medical Screen Exam Complete:  Yes


Emergency Medical Condition:  Yes


Medical Record Reviewed:  Yes


Interpretation(s)





Vital Signs








  Date Time  Temp Pulse Resp B/P (MAP) Pulse Ox O2 Delivery O2 Flow Rate FiO2


 


5/22/18 11:29 99.0 125 24 158/100 (119)    








Differential Diagnosis


Sepsis, endocarditis, pericarditis, strep pharyngitis, pneumonia, anxiety 

reaction


Narrative Course


Patient is a 32 year old male who presents to the ER with multiple complaints.  

Protocol chest pain labs are ordered by RN in triage.  





During the course of the patients emergency department visit, the patients 

history, examination, and differential diagnosis were reviewed with the 

patient. The patient was placed on a cardiac monitor with oximetry and frequent 

blood pressure monitoring. The patient had an IV access obtained and blood work 

sent for analysis. Sepsis workup initiated as patient did have increased RR as 

well as tachycardia upon presentation to ER.  





Past cultures were reviewed, he was positive for group C beta strep.  





The patient was initially provided IVF.  





The patients laboratory studies were reviewed and remarkable for 








Laboratory Tests








Test


  5/22/18


13:19 5/22/18


13:30 5/22/18


14:00 5/22/18


16:47


 


Urine Opiates Screen POS (NEG)    


 


Urine Barbiturates Screen NEG (NEG)    


 


Urine Amphetamines Screen NEG (NEG)    


 


Urine Benzodiazepines Screen NEG (NEG)    


 


Urine Cocaine Screen NEG (NEG)    


 


Urine Cannabinoids Screen NEG (NEG)    


 


D-Dimer Quantitative (PE/DVT)


  


  1.28 MG/L FEU


(0.00-0.50) 


  


 


 


Lactic Acid Level


  


  1.7 mmol/L


(0.4-2.0) 


  


 


 


White Blood Count


  


  


  10.0 TH/MM3


(4.0-11.0) 


 


 


Red Blood Count


  


  


  3.86 MIL/MM3


(4.50-5.90) 


 


 


Hemoglobin


  


  


  12.0 GM/DL


(13.0-17.0) 


 


 


Hematocrit


  


  


  35.2 %


(39.0-51.0) 


 


 


Mean Corpuscular Volume


  


  


  91.2 FL


(80.0-100.0) 


 


 


Mean Corpuscular Hemoglobin


  


  


  31.1 PG


(27.0-34.0) 


 


 


Mean Corpuscular Hemoglobin


Concent 


  


  34.1 %


(32.0-36.0) 


 


 


Red Cell Distribution Width


  


  


  14.6 %


(11.6-17.2) 


 


 


Platelet Count


  


  


  299 TH/MM3


(150-450) 


 


 


Mean Platelet Volume


  


  


  6.7 FL


(7.0-11.0) 


 


 


Neutrophils (%) (Auto)


  


  


  69.0 %


(16.0-70.0) 


 


 


Lymphocytes (%) (Auto)


  


  


  21.3 %


(9.0-44.0) 


 


 


Monocytes (%) (Auto)


  


  


  6.7 %


(0.0-8.0) 


 


 


Eosinophils (%) (Auto)


  


  


  2.7 %


(0.0-4.0) 


 


 


Basophils (%) (Auto)


  


  


  0.3 %


(0.0-2.0) 


 


 


Neutrophils # (Auto)


  


  


  6.9 TH/MM3


(1.8-7.7) 


 


 


Lymphocytes # (Auto)


  


  


  2.1 TH/MM3


(1.0-4.8) 


 


 


Monocytes # (Auto)


  


  


  0.7 TH/MM3


(0-0.9) 


 


 


Eosinophils # (Auto)


  


  


  0.3 TH/MM3


(0-0.4) 


 


 


Basophils # (Auto)


  


  


  0.0 TH/MM3


(0-0.2) 


 


 


CBC Comment   DIFF FINAL  


 


Differential Comment     


 


Blood Urea Nitrogen   8 MG/DL (7-18)  


 


Creatinine


  


  


  0.84 MG/DL


(0.60-1.30) 


 


 


Random Glucose


  


  


  129 MG/DL


() 


 


 


Calcium Level


  


  


  8.3 MG/DL


(8.5-10.1) 


 


 


Sodium Level


  


  


  139 MEQ/L


(136-145) 


 


 


Potassium Level


  


  


  3.4 MEQ/L


(3.5-5.1) 


 


 


Chloride Level


  


  


  101 MEQ/L


() 


 


 


Carbon Dioxide Level


  


  


  28.2 MEQ/L


(21.0-32.0) 


 


 


Anion Gap


  


  


  10 MEQ/L


(5-15) 


 


 


Estimat Glomerular Filtration


Rate 


  


  107 ML/MIN


(>89) 


 


 


Total Creatine Kinase


  


  


  49 U/L


() 


 


 


Troponin I


  


  


  LESS THAN 0.02


NG/ML LESS THAN 0.02


NG/ML




















Patient reevaluated, patient feeling much better at this time.  Patient had a 

significant workup today which including a septic workup.  Patient with no 

signs of sepsis at this time.  CBC shows a white blood cell count 10, 

hemoglobin 12.0, hematocrit 35.2, platelets 299, lactic acid was 1.7


Sodium 139, potassium 3.4, BUN 8, creatinine 0.84, glucose 29, lactic acid is 

1.7, troponin less than 0.02, delta troponin is less than 0.02.





Patient d-dimer is +1.28, CT was performed to rule out PE.  


CTA showed no evidence of pulmonary embolism.





Vital signs have stabilized








Vital Signs








  Date Time  Temp Pulse Resp B/P (MAP) Pulse Ox O2 Delivery O2 Flow Rate FiO2


 


5/22/18 17:23 98.0 76 19 134/90 (105) 98 Room Air  


 


5/22/18 13:38      Room Air  


 


5/22/18 11:29 99.0 125 24 158/100 (119)    





Plan to treat for patients group C beta strep





Patient will follow-up with all cultures and today.





Signs and symptoms of when to return to the emergency room was reviewed the 

patient.





Diagnosis





 Primary Impression:  


 Strep pharyngitis


 Additional Impression:  


 Chest pain


Patient Instructions:  General Instructions





***Additional Instructions:  


**Please provide patient with a copy of their lab work and studies at discharge*

*





Please follow up with your primary care doctor in 2-3 days





Return to the ER if symptoms worsen or progress





Return to the ER as needed





Please follow-up with all cultures from today





Return to the emergency room as needed





Take all antibiotics as prescribed


***Med/Other Pt SpecificInfo:  Prescription(s) given


Scripts


Amoxicillin-Clavulanate (Augmentin) 875-125 Mg Tab


1 TAB PO BID for Infection for 10 Days, #20 TAB 0 Refills


   Prov: Reba Hinojosa DO         5/22/18


Disposition:  01 DISCHARGE HOME


Condition:  Stable











Reba Hinojosa DO May 22, 2018 13:05

## 2018-05-22 NOTE — EKG
Date Performed: 05/22/2018       Time Performed: 11:42:56

 

PTAGE:      32 years

 

EKG:      SINUS TACHYCARDIA WITH SHORT FL INTERVAL POSSIBLE LEFT ATRIAL ENLARGEMENT BORDERLINE RIGHT 
AXIS DEVIATION INCOMPLETE RIGHT BUNDLE BRANCH BLOCK ABNORMAL RHYTHM ECG Compared to prior electrocard
iogram, rate has increased 

 

 PREVIOUS TRACING            : 05/12/2018 20.23

 

DOCTOR:   Jordan Andersen  Interpretating Date/Time  05/22/2018 14:02:49

## 2018-05-22 NOTE — RADRPT
EXAM DATE:  5/22/2018 4:33 PM EDT

AGE/SEX:        32 years / Male



INDICATIONS:  Chest pain; rule out pulmonary embolus.



CLINICAL DATA:  This is the patient's initial encounter. Patient reports that signs and symptoms have
 been present for 1 day and indicates a pain score of 6/10. 

                                                                          

MEDICAL/SURGICAL HISTORY:   Cardiovascular disease.  Hypertension.  Diabetes. None.



RADIATION DOSE:  9.52 CTDI (mGy)









COMPARISON:      No prior [facility] exams available for comparison.



TECHNIQUE:  Volumetric scanning was performed using a multi-row detector CT scanner during bolus infu
brett of 74 ml Omnipaque 350 (iohexol)  nonionic water-soluble contrast as a single exam dose. The reji
a was post processed with a variety of visualization algorithms including full volume maximum intensi
ty projection and sliding thin slab reformation.  Using automated exposure control and adjustment of 
the mA and/or kV according to patient size, radiation dose was kept as low as reasonably achievable t
o obtain optimal diagnostic quality images.



FINDINGS:  No evidence of pulmonary embolism. Lungs are clear. No evidence of effusion. No evidence o
f mediastinal adenopathy. No evidence of axillary adenopathy or chest wall destruction.



CONCLUSION:

No evidence of pulmonary embolism



Electronically signed by: Milton Awan MD  5/22/2018 4:41 PM EDT

## 2022-06-30 NOTE — HHI.HP
__________________________________________________





HPI


Service


[facility]


Primary Care Physician


No Primary Care Physician


Admission Diagnosis


sepsis, left foot cellulitis with abscess


Diagnoses:  


(1) Sepsis


Diagnosis:  Principal





(2) Cellulitis of left foot


Diagnosis:  Principal





(3) Foot abscess, left


Diagnosis:  Principal





(4) Tobacco abuse


Diagnosis:  Principal





(5) Substance abuse


Diagnosis:  Principal





Travel History


International Travel<30 Days:  No


Contact w/Intl Traveler <30 Da:  No


Traveled to Known Affected Are:  No


History of Present Illness


This is a 31-year-old male with a PMH of Tobacco Abuse and Substance Abuse w/ 

Cocaine and Opiates who presented to the ER w/ complaints of left foot pain and 

redness x6 days.  States he popped what he thought was a pimple on his foot, 

following day noted pain and redness up his foot/leg, symptoms progressively 

worse in the last 3 days.  Denies fever, chills or recent IVDU.  On arrival, BP 

132/90, , O2 sat 100% on RA, Temp 100.8.  WBC 18.  Chemistry essentially 

unremarkable except for GFR 81.  Lactic Acid normal.  Foot X-ray was soft 

tissue swelling on dorsal lateral aspect of foot.  On exam, patient noted to 

have a fluctuant abscess of left foot with streaking up left leg.  S/p Blood/

Wound Cultures, Vanc/Zosyn in ER.





Review of Systems


Except as stated in HPI:  all other systems reviewed are Neg


ROS: 14 point review of systems otherwise negative.





Past Family Social History


Past Medical History


PMH:  Tobacco Abuse and Substance Abuse w/ Cocaine and Opiates


Past Surgical History


PAST SURGICAL HISTORY:  Right Arm Surgery


Allergies:  


Coded Allergies:  


     No Known Allergies (Unverified , 3/11/17)


Family History


PAST FAMILY HISTORY:  Reviewed.  No h/o DM or CAD


Social History


PAST SOCIAL HISTORY:  Negative for alcohol.  Smokes 1/2-1ppd.  Positive for 

Cocaine/Opiates.





Physical Exam


Vital Signs





 Vital Signs








  Date Time  Temp Pulse Resp B/P Pulse Ox O2 Delivery O2 Flow Rate FiO2


 


3/11/17 22:15 100.8 104 24 132/90 100   








Physical Exam


PE:


GENERAL: Young male in no acute distress.


HEENT: PERRLA, EOMI. No scleral icterus or conjunctival pallor. No lid lag or 

facial droop.  


CARDIOVASCULAR: Regular rate and rhythm.  No obvious murmurs to auscultation. 

No chest tenderness to palpation. 


RESPIRATORY: No obvious rhonchi or wheezing. Clear to auscultation. Breath 

sounds equal bilaterally. 


GASTROINTESTINAL: Abdomen soft, non-tender, nondistended. BS normal. 


MUSCULOSKELETAL: Extremities without clubbing, cyanosis, or edema. No obvious 

deformities.  Left foot with dorsal abscess, surrounding erythema and edema 

with streaking up left leg, some purulent drainage noted.


NEUROLOGICAL: Awake, alert and oriented x4. No focal neurologic deficits. 

Moving both upper and lower extremities spontaneously.


Laboratory





Laboratory Tests








Test 3/11/17





 22:24


 


White Blood Count 18.0 


 


Red Blood Count 4.63 


 


Hemoglobin 13.9 


 


Hematocrit 40.5 


 


Mean Corpuscular Volume 87.3 


 


Mean Corpuscular Hemoglobin 30.1 


 


Mean Corpuscular Hemoglobin 34.5 





Concent 


 


Red Cell Distribution Width 14.0 


 


Platelet Count 278 


 


Mean Platelet Volume 8.4 


 


Neutrophils (%) (Auto) 82.6 


 


Lymphocytes (%) (Auto) 10.7 


 


Monocytes (%) (Auto) 5.8 


 


Eosinophils (%) (Auto) 0.3 


 


Basophils (%) (Auto) 0.6 


 


Neutrophils # (Auto) 14.9 


 


Lymphocytes # (Auto) 1.9 


 


Monocytes # (Auto) 1.0 


 


Eosinophils # (Auto) 0.0 


 


Basophils # (Auto) 0.1 


 


CBC Comment DIFF FINAL 


 


Differential Comment  


 


Sodium Level 135 


 


Potassium Level 4.0 


 


Chloride Level 97 


 


Carbon Dioxide Level 28.4 


 


Anion Gap 10 


 


Blood Urea Nitrogen 10 


 


Creatinine 1.07 


 


Estimat Glomerular Filtration 81 





Rate 


 


Random Glucose 108 


 


Calcium Level 8.7 


 


Total Bilirubin 0.7 


 


Aspartate Amino Transf 32 





(AST/SGOT) 


 


Alanine Aminotransferase 21 





(ALT/SGPT) 


 


Alkaline Phosphatase 106 


 


C-Reactive Protein 14.70 


 


Total Protein 8.2 


 


Albumin 3.7 














 Date/Time Procedure Status





Source Growth 


 


 3/11/17 22:30 Aerobic Blood Culture Received





Blood Peripheral Pending 





 3/11/17 22:30 Anaerobic Blood Culture Received





Blood Peripheral Pending 








Result Diagram:  


3/11/17 2224                                                                   

             3/11/17 2224








Assessment and Plan


Problem List:  


(1) Sepsis


ICD Code:  A41.9


Status:  Acute


(2) Foot abscess, left


ICD Code:  L02.612


Status:  Acute


(3) Cellulitis of left foot


ICD Code:  L03.116


Status:  Acute


(4) Tobacco abuse


ICD Code:  Z72.0


Status:  Acute


(5) Substance abuse


ICD Code:  F19.10


Status:  Acute


Assessment and Plan


A/P:


1.  Sepsis:  Temp 100.8, , WBC 18, Source-Left Foot Abscess/Cellulitis, s/

p Blood/Wound Cultures, IV Vanc/Zosyn.  Follow up cultures, continue IV Abx. 


2.  Left Foot Abscess/Cellulitis:  symptoms x1 wk, progressively worse over 

last 3 days.  Foot X-ray w/ soft tissue swelling on dorsal lateral aspect of 

foot, images reviewed by me.  S/p cultures and IV Abx as above, will continue.  

Consult Podiatry for further evaluation.


3.  Substance Abuse:  h/o Cocaine/Opiate abuse, reports takes Dilaudid po for 

chronic pain.  No confirmation of this.  Caution w/ opiates. 


4.  Tobacco Abuse:  Counselled.  Ativan/NicoDerm prn for withdrawal.


5.  DVT Prophylaxis:  Mechanical contraindication due to wound


6.  Social work for d/c planning as needed. 


7.  Case discussed w/ ER physician at length.





Physician Certification


2 Midnight Certification Type:  Admission for Inpatient Services


Order for Inpatient Services


The services are ordered in accordance with Medicare regulations or non-

Medicare payer requirements, as applicable.  In the case of services not 

specified as inpatient-only, they are appropriately provided as inpatient 

services in accordance with the 2-midnight benchmark.


Estimated LOS (days):  2


 days is the estimated time the patient will need to remain in the hospital, 

assuming treatment plan goals are met and no additional complications.


Post-Hospital Plan:  Not yet determined





Problem Qualifiers





(1) Sepsis:  


Qualified Code:  A41.9 - Sepsis, due to unspecified organism





Tere Knapp MD Mar 11, 2017 23:39
[place]